# Patient Record
Sex: MALE | Race: WHITE | Employment: FULL TIME | URBAN - NONMETROPOLITAN AREA
[De-identification: names, ages, dates, MRNs, and addresses within clinical notes are randomized per-mention and may not be internally consistent; named-entity substitution may affect disease eponyms.]

---

## 2017-12-04 ENCOUNTER — HOSPITAL ENCOUNTER (OUTPATIENT)
Dept: CT IMAGING | Age: 57
Discharge: HOME | End: 2017-12-04
Payer: COMMERCIAL

## 2017-12-04 DIAGNOSIS — Z72.0 TOBACCO ABUSE: ICD-10-CM

## 2017-12-04 PROCEDURE — G0297 LDCT FOR LUNG CA SCREEN: HCPCS

## 2018-09-10 ENCOUNTER — HOSPITAL ENCOUNTER (EMERGENCY)
Age: 58
Discharge: HOME | End: 2018-09-10
Payer: COMMERCIAL

## 2018-09-10 ENCOUNTER — HOSPITAL ENCOUNTER (EMERGENCY)
Dept: GENERAL RADIOLOGY | Age: 58
Discharge: HOME | End: 2018-09-10
Payer: COMMERCIAL

## 2018-09-10 VITALS
RESPIRATION RATE: 18 BRPM | HEART RATE: 68 BPM | SYSTOLIC BLOOD PRESSURE: 135 MMHG | OXYGEN SATURATION: 92 % | HEIGHT: 70 IN | WEIGHT: 215 LBS | DIASTOLIC BLOOD PRESSURE: 65 MMHG | TEMPERATURE: 97.8 F | BODY MASS INDEX: 30.78 KG/M2

## 2018-09-10 DIAGNOSIS — J20.9 COPD (CHRONIC OBSTRUCTIVE PULMONARY DISEASE) WITH ACUTE BRONCHITIS (HCC): Primary | ICD-10-CM

## 2018-09-10 DIAGNOSIS — J44.0 COPD (CHRONIC OBSTRUCTIVE PULMONARY DISEASE) WITH ACUTE BRONCHITIS (HCC): Primary | ICD-10-CM

## 2018-09-10 PROCEDURE — 99203 OFFICE O/P NEW LOW 30 MIN: CPT | Performed by: NURSE PRACTITIONER

## 2018-09-10 PROCEDURE — 99214 OFFICE O/P EST MOD 30 MIN: CPT

## 2018-09-10 PROCEDURE — 6370000000 HC RX 637 (ALT 250 FOR IP): Performed by: NURSE PRACTITIONER

## 2018-09-10 PROCEDURE — 71046 X-RAY EXAM CHEST 2 VIEWS: CPT

## 2018-09-10 PROCEDURE — 2709999900 HC NON-CHARGEABLE SUPPLY

## 2018-09-10 PROCEDURE — 94640 AIRWAY INHALATION TREATMENT: CPT

## 2018-09-10 RX ORDER — PREDNISONE 20 MG/1
20 TABLET ORAL 2 TIMES DAILY
Qty: 10 TABLET | Refills: 0 | Status: SHIPPED | OUTPATIENT
Start: 2018-09-10 | End: 2018-09-15

## 2018-09-10 RX ORDER — PROMETHAZINE HYDROCHLORIDE AND CODEINE PHOSPHATE 6.25; 1 MG/5ML; MG/5ML
5 SYRUP ORAL 4 TIMES DAILY PRN
Qty: 120 ML | Refills: 0 | Status: SHIPPED | OUTPATIENT
Start: 2018-09-10 | End: 2018-09-17

## 2018-09-10 RX ORDER — IPRATROPIUM BROMIDE AND ALBUTEROL SULFATE 2.5; .5 MG/3ML; MG/3ML
1 SOLUTION RESPIRATORY (INHALATION) ONCE
Status: COMPLETED | OUTPATIENT
Start: 2018-09-10 | End: 2018-09-10

## 2018-09-10 RX ORDER — LEVOFLOXACIN 750 MG/1
750 TABLET ORAL DAILY
Qty: 5 TABLET | Refills: 0 | Status: SHIPPED | OUTPATIENT
Start: 2018-09-10 | End: 2018-09-15

## 2018-09-10 RX ORDER — GLIMEPIRIDE 1 MG/1
1 TABLET ORAL 2 TIMES DAILY
COMMUNITY

## 2018-09-10 RX ORDER — ALBUTEROL SULFATE 2.5 MG/3ML
2.5 SOLUTION RESPIRATORY (INHALATION) EVERY 6 HOURS PRN
COMMUNITY

## 2018-09-10 RX ORDER — TAMSULOSIN HYDROCHLORIDE 0.4 MG/1
0.4 CAPSULE ORAL DAILY
COMMUNITY
End: 2019-06-19

## 2018-09-10 RX ADMIN — IPRATROPIUM BROMIDE AND ALBUTEROL SULFATE 1 AMPULE: .5; 3 SOLUTION RESPIRATORY (INHALATION) at 10:03

## 2018-09-10 ASSESSMENT — ENCOUNTER SYMPTOMS
RHINORRHEA: 0
ABDOMINAL PAIN: 0
SHORTNESS OF BREATH: 1
SINUS PRESSURE: 0
DIARRHEA: 0
CHEST TIGHTNESS: 0
VOMITING: 0
NAUSEA: 0
SINUS PAIN: 0
BACK PAIN: 0
COUGH: 1
SORE THROAT: 0

## 2018-09-10 NOTE — ED TRIAGE NOTES
Pt to [facility] ambulatory with wife with URI s/s. Pt stated cough started on Friday. Pt has runny nose, no fever, and no nausea or vomiting.

## 2018-09-10 NOTE — ED NOTES
Patient discharge instructions given to pt and pt verbalized understanding, 3 px given, no other needs at this time, and pt left in stable condition.      Sarwat Mendoza RN  09/10/18 [id number]

## 2018-09-10 NOTE — LETTER
[facility]  [address] [place], [place] [address]ROHAN ROSS II.[facility] [address]  Phone: [phone]               September 10, 2018    Patient: Matthew Romeo   YOB: 1960   Date of Visit: 9/10/2018       To Whom It May Concern: Cheryle Lefevre was seen and treated in our emergency department on 9/10/2018. He may return to work on 9/12/2018.       Sincerely,       ADDY Hidalgo CNP         Signature:Electronically signed by ADDY Hidalgo CNP on 9/10/2018 at 10:46 AM  __________________________________

## 2018-09-10 NOTE — ED PROVIDER NOTES
Exam   Constitutional: He is oriented to person, place, and time. Vital signs are normal. He appears well-developed and well-nourished. He is cooperative. Non-toxic appearance. He does not have a sickly appearance. He does not appear ill. No distress. HENT:   Head: Normocephalic. Right Ear: Hearing, tympanic membrane, external ear and ear canal normal. No drainage, swelling or tenderness. No mastoid tenderness. Left Ear: Hearing, tympanic membrane, external ear and ear canal normal. No drainage, swelling or tenderness. No mastoid tenderness. Nose: Nose normal. Right sinus exhibits no maxillary sinus tenderness and no frontal sinus tenderness. Left sinus exhibits no maxillary sinus tenderness and no frontal sinus tenderness. Mouth/Throat: Uvula is midline, oropharynx is clear and moist and mucous membranes are normal. No oropharyngeal exudate, posterior oropharyngeal edema or posterior oropharyngeal erythema. Neck: Normal range of motion and full passive range of motion without pain. Neck supple. Cardiovascular: Normal rate, regular rhythm, S1 normal, S2 normal and normal heart sounds. Pulmonary/Chest: Effort normal. No accessory muscle usage. No respiratory distress. He has decreased breath sounds in the right upper field, the right middle field, the right lower field, the left upper field, the left middle field and the left lower field. He has no wheezes. He has no rhonchi. He has rales in the right lower field. He exhibits no tenderness. Abdominal: Normal appearance. Lymphadenopathy:        Head (right side): No submental, no submandibular, no tonsillar, no preauricular, no posterior auricular and no occipital adenopathy present. Head (left side): No submental, no submandibular, no tonsillar, no preauricular, no posterior auricular and no occipital adenopathy present. He has no cervical adenopathy. Right: No supraclavicular adenopathy present.         Left: No supraclavicular the treatment plan. PATIENT REFERRED TO:  May Mao MD  [address] [facility] [address] / [facility] [place] [address]      DISCHARGE MEDICATIONS:  New Prescriptions    LEVOFLOXACIN (LEVAQUIN) 750 MG TABLET    Take 1 tablet by mouth daily for 5 days    PREDNISONE (DELTASONE) 20 MG TABLET    Take 1 tablet by mouth 2 times daily for 5 days    PROMETHAZINE-CODEINE (PHENERGAN WITH CODEINE) 6.25-10 MG/5ML SYRUP    Take 5 mLs by mouth 4 times daily as needed for Cough for up to 7 days. .     Controlled Substances Monitoring:     RX Monitoring 9/10/2018   Attestation The Prescription Monitoring Report for this patient was reviewed today. Documentation No signs of potential drug abuse or diversion identified. Patient give standard precautions with this class of medications such as may cause drowsiness. May impair ability to operate vehicles or machinery. Do not use in combination with alcohol while taking this medication                                              Supportive Documentation of Scheduled Medication    1. Patient's medical history, current medications,and those prescribed by other   providers and OARRS, were reviewed by me. 2. Physical exam revealed see assessment portion of chart. 3. Diagnosis: COPD with acute bronchitis    4. Contraindications were ruled out, no current pain medication usage, no drug interactions with current medication, no documented history of substance abuse or inpatient/outpatient rehabilitation. 5. Rationale for prescription(s): Phenergan With Codeine 5 MLS every 6 hours for cough and congestion dispense 120 with no refills. This is for pain across the upper abdomen and chest with cough.     Discontinued Medications    No medications on file       Current Discharge Medication List          ADDY Hidalgo - CNP    (Please note that portions of this note were completed with a voice recognition program.  Efforts were made to edit the dictations but occasionally words are

## 2019-03-12 ENCOUNTER — HOSPITAL ENCOUNTER (OUTPATIENT)
Dept: CT IMAGING | Age: 59
Discharge: HOME | End: 2019-03-12
Payer: COMMERCIAL

## 2019-03-12 DIAGNOSIS — Z72.0 TOBACCO USE: ICD-10-CM

## 2019-03-12 PROCEDURE — G0297 LDCT FOR LUNG CA SCREEN: HCPCS

## 2019-06-11 ENCOUNTER — HOSPITAL ENCOUNTER (OUTPATIENT)
Dept: MRI IMAGING | Age: 59
Discharge: HOME | End: 2019-06-11
Payer: COMMERCIAL

## 2019-06-11 DIAGNOSIS — R42 DIZZINESS: ICD-10-CM

## 2019-06-11 PROCEDURE — 70553 MRI BRAIN STEM W/O & W/DYE: CPT

## 2019-06-11 PROCEDURE — 6360000004 HC RX CONTRAST MEDICATION: Performed by: FAMILY MEDICINE

## 2019-06-11 PROCEDURE — A9579 GAD-BASE MR CONTRAST NOS,1ML: HCPCS | Performed by: FAMILY MEDICINE

## 2019-06-11 RX ADMIN — GADOTERIDOL 20 ML: 279.3 INJECTION, SOLUTION INTRAVENOUS at 08:13

## 2019-06-19 ENCOUNTER — OFFICE VISIT (OUTPATIENT)
Dept: CARDIOLOGY CLINIC | Age: 59
End: 2019-06-19
Payer: COMMERCIAL

## 2019-06-19 VITALS
DIASTOLIC BLOOD PRESSURE: 62 MMHG | HEART RATE: 80 BPM | SYSTOLIC BLOOD PRESSURE: 130 MMHG | WEIGHT: 217 LBS | BODY MASS INDEX: 31.07 KG/M2 | HEIGHT: 70 IN

## 2019-06-19 DIAGNOSIS — R06.09 DYSPNEA ON EXERTION: ICD-10-CM

## 2019-06-19 DIAGNOSIS — R07.89 CHEST HEAVINESS: ICD-10-CM

## 2019-06-19 DIAGNOSIS — R42 DIZZINESS: ICD-10-CM

## 2019-06-19 DIAGNOSIS — R94.31 ABNORMAL ECG: Primary | ICD-10-CM

## 2019-06-19 DIAGNOSIS — F17.200 SMOKING: ICD-10-CM

## 2019-06-19 PROCEDURE — 99204 OFFICE O/P NEW MOD 45 MIN: CPT | Performed by: NUCLEAR MEDICINE

## 2019-06-19 RX ORDER — ACETAZOLAMIDE 500 MG/1
500 CAPSULE, EXTENDED RELEASE ORAL 2 TIMES DAILY
COMMUNITY
End: 2020-03-05

## 2019-06-19 ASSESSMENT — ENCOUNTER SYMPTOMS
COLOR CHANGE: 0
ABDOMINAL DISTENTION: 0
CONSTIPATION: 0
VOMITING: 0
CHEST TIGHTNESS: 0
SHORTNESS OF BREATH: 1
BLOOD IN STOOL: 0
NAUSEA: 0
BACK PAIN: 1
RECTAL PAIN: 0
RHINORRHEA: 0
ANAL BLEEDING: 0
DIARRHEA: 0
PHOTOPHOBIA: 0
ABDOMINAL PAIN: 0

## 2019-06-19 NOTE — PROGRESS NOTES
[address] CARDIOLOGY  [facility] [address]  [place] [address]  Dept: [phone]  Dept Fax: [phone]  Loc: [phone]    Visit Date: 2019    Alba Farr is a 60 y.o. male who presents todayfor:  Chief Complaint   Patient presents with    New Patient    Check-Up    Dizziness    Diabetes    Shortness of Breath   here for the first time  Started with dizziness and vertigo type situation   Started a month or so ago  Seen PCP and had some work up   ECG was abnormal   RBBB and LAFB  Diffuse st st changes   Dizziness is motion related most of the times  Balance type of issue  No full blown syncope  Does have some dyspnea on heavy exertion   Might have some chest tightness at times  Not exertional   No radiation   Mild in nature  Heavy type   No known CAD  Does have Dm for a while   Does have borderline BP and hyperlipidemia  He is a smoker as well   Been smoking for a long while  Few cig per day   Family history of CAD       HPI:  HPI  Past Medical History:   Diagnosis Date    Asthma     COPD (chronic obstructive pulmonary disease) ([facility] [address]       Past Surgical History:   Procedure Laterality Date    APPENDECTOMY      HERNIA REPAIR       History reviewed. No pertinent family history.   Social History     Tobacco Use    Smoking status: Former Smoker     Types: Cigarettes     Last attempt to quit: 2014     Years since quittin.5    Smokeless tobacco: Never Used   Substance Use Topics    Alcohol use: No      Current Outpatient Medications   Medication Sig Dispense Refill    acetaZOLAMIDE (DIAMOX) 500 MG extended release capsule Take 500 mg by mouth 2 times daily      metFORMIN (GLUCOPHAGE) 1000 MG tablet Take 1,000 mg by mouth 2 times daily (with meals)      glimepiride (AMARYL) 1 MG tablet Take 1 mg by mouth 2 times daily      Empagliflozin-Linagliptin (GLYXAMBI) 10-5 MG TABS Take 10 mg by mouth daily      Albuterol Sulfate (PROAIR HFA IN) Inhale 1-2 puffs into the lungs as needed      Budesonide-Formoterol Fumarate (SYMBICORT IN) Inhale into the lungs      albuterol (PROVENTIL) (2.5 MG/3ML) 0.083% nebulizer solution Take 2.5 mg by nebulization every 6 hours as needed for Wheezing       No current facility-administered medications for this visit. Allergies   Allergen Reactions    Amoxicillin Swelling     Health Maintenance   Topic Date Due    Hepatitis C screen  1960    Pneumococcal 0-64 years Vaccine (1 of 1 - PPSV23) 03/03/1965    HIV screen  03/03/1974    DTaP/Tdap/Td vaccine (1 - Tdap) 03/03/1978    Shingles Vaccine (1 of 2) 03/03/2009    Colon cancer screen colonoscopy  03/03/2009    Potassium monitoring  02/07/2016    Creatinine monitoring  02/07/2016    Lipid screen  09/14/2016    Flu vaccine (Season Ended) 09/01/2019       Subjective:  Review of Systems   Constitutional: Positive for fatigue. Negative for chills and diaphoresis. HENT: Negative for ear discharge, nosebleeds and rhinorrhea. Eyes: Negative for photophobia. Respiratory: Positive for shortness of breath. Negative for chest tightness. Cardiovascular: Negative for chest pain, palpitations and leg swelling. Gastrointestinal: Negative for abdominal distention, abdominal pain, anal bleeding, blood in stool, constipation, diarrhea, nausea, rectal pain and vomiting. Endocrine: Negative for polyphagia. Genitourinary: Negative for dysuria, frequency and urgency. Musculoskeletal: Positive for back pain. Negative for arthralgias, gait problem, joint swelling, myalgias, neck pain and neck stiffness. Skin: Negative for color change, pallor, rash and wound. Allergic/Immunologic: Negative for food allergies. Neurological: Positive for dizziness and headaches. Negative for syncope. Hematological: Negative for adenopathy.    Psychiatric/Behavioral: Negative for agitation, behavioral problems, confusion, decreased concentration, dysphoric mood and hallucinations. The patient is not nervous/anxious and is not hyperactive. Objective:  Physical Exam   Constitutional: He is oriented to person, place, and time. He appears well-developed. HENT:   Head: Normocephalic. Right Ear: External ear normal.   Left Ear: External ear normal.   Nose: Nose normal.   Mouth/Throat: Oropharynx is clear and moist.   Eyes: Pupils are equal, round, and reactive to light. Conjunctivae are normal. Right eye exhibits no discharge. Neck: Neck supple. No JVD present. No tracheal deviation present. No thyromegaly present. Cardiovascular: Normal rate, regular rhythm and intact distal pulses. Exam reveals no gallop. Murmur heard. Pulmonary/Chest: No stridor. No respiratory distress. He has no rales. He exhibits no tenderness. Abdominal: He exhibits no distension and no mass. There is no tenderness. There is no rebound and no guarding. No hernia. Musculoskeletal: He exhibits no edema. Lymphadenopathy:     He has no cervical adenopathy. Neurological: He is alert and oriented to person, place, and time. He displays normal reflexes. No cranial nerve deficit or sensory deficit. He exhibits normal muscle tone. Coordination normal.   Skin: No rash noted. He is not diaphoretic. No erythema. Psychiatric: He has a normal mood and affect. His behavior is normal. Judgment and thought content normal.     /62   Pulse 80   Ht 5' 10\" (1.778 m)   Wt 217 lb (98.4 kg)   BMI 31.14 kg/m²     Assessment:      Diagnosis Orders   1. Abnormal ECG  ECHO Complete 2D W Doppler W Color    NM MYOCARDIAL SPECT REST EXERCISE OR RX   2. Dyspnea on exertion  ECHO Complete 2D W Doppler W Color    NM MYOCARDIAL SPECT REST EXERCISE OR RX   3. Dizziness     4. Smoking     5.  Chest heaviness  ECHO Complete 2D W Doppler W Color    NM MYOCARDIAL SPECT REST EXERCISE OR RX   does have underlying COPD and higher risk for CAD  Concerning risk and symptoms  Dizziness is likely not cardiac and more ENT related  Yet I think he does have underlying CAD  ? ? Need for cath vs stress test to start with   Smoking: discussed with the patient the importance of smoke cessation especially with the risk of CAD. ECG reviewed today     Plan:  Return in about 3 months (around 9/19/2019). Discussed  Non invasive cardiac testing will be ordered to further evaluate for any ischemic or structural heart disease as a cause of the patient symptoms. We will proceed with a Stress Cardiolite test and echo soon. Consider antivert vs refer to ENT  Decide after that   Patient was advised to report to the ER if he has recurrent symptoms with specific instructions given about severity and duration of symptoms    Continue risk factor modification and medical management  Thank you for allowing me to participate in the care of your patient. Please don't hesitate to contact me regarding any further issues related to the patient care    Orders Placed:  Orders Placed This Encounter   Procedures    NM MYOCARDIAL SPECT REST EXERCISE OR RX     Standing Status:   Future     Standing Expiration Date:   6/18/2020     Order Specific Question:   Reason for Exam?     Answer:   Chest pain     Order Specific Question:   Reason for Exam?     Answer:   EKG abnormalities    ECHO Complete 2D W Doppler W Color     Standing Status:   Future     Standing Expiration Date:   6/18/2020     Order Specific Question:   Reason for exam:     Answer:   dyspnea       Medications Prescribed:  No orders of the defined types were placed in this encounter. Discussed use, benefit, and side effects of prescribed medications. All patient questions answered. Pt voicedunderstanding. Instructed to continue current medications, diet and exercise. Continue risk factor modification and medical management. Patient agreed with treatment plan. Follow up as directed.     Electronically signedby Sarthak Rousseau MD on 6/19/2019 at 8:08 AM

## 2019-07-03 ENCOUNTER — HOSPITAL ENCOUNTER (OUTPATIENT)
Dept: NON INVASIVE DIAGNOSTICS | Age: 59
Discharge: HOME | End: 2019-07-03
Payer: COMMERCIAL

## 2019-07-03 VITALS — WEIGHT: 215 LBS | HEIGHT: 70 IN | BODY MASS INDEX: 30.78 KG/M2

## 2019-07-03 DIAGNOSIS — R07.89 CHEST HEAVINESS: ICD-10-CM

## 2019-07-03 DIAGNOSIS — R94.31 ABNORMAL ECG: ICD-10-CM

## 2019-07-03 DIAGNOSIS — R06.09 DYSPNEA ON EXERTION: ICD-10-CM

## 2019-07-03 LAB
LV EF: 55 %
LVEF MODALITY: NORMAL

## 2019-07-03 PROCEDURE — 93018 CV STRESS TEST I&R ONLY: CPT | Performed by: NUCLEAR MEDICINE

## 2019-07-03 PROCEDURE — A9500 TC99M SESTAMIBI: HCPCS | Performed by: NUCLEAR MEDICINE

## 2019-07-03 PROCEDURE — 3430000000 HC RX DIAGNOSTIC RADIOPHARMACEUTICAL: Performed by: NUCLEAR MEDICINE

## 2019-07-03 PROCEDURE — 78452 HT MUSCLE IMAGE SPECT MULT: CPT | Performed by: NUCLEAR MEDICINE

## 2019-07-03 PROCEDURE — 93016 CV STRESS TEST SUPVJ ONLY: CPT | Performed by: NUCLEAR MEDICINE

## 2019-07-03 PROCEDURE — 78452 HT MUSCLE IMAGE SPECT MULT: CPT

## 2019-07-03 PROCEDURE — 93017 CV STRESS TEST TRACING ONLY: CPT | Performed by: NUCLEAR MEDICINE

## 2019-07-03 PROCEDURE — 2709999900 HC NON-CHARGEABLE SUPPLY

## 2019-07-03 PROCEDURE — 93306 TTE W/DOPPLER COMPLETE: CPT

## 2019-07-03 PROCEDURE — 6360000002 HC RX W HCPCS

## 2019-07-03 RX ADMIN — Medication 32.9 MILLICURIE: at 13:30

## 2019-07-03 RX ADMIN — Medication 10.3 MILLICURIE: at 11:59

## 2019-07-05 ENCOUNTER — TELEPHONE (OUTPATIENT)
Dept: CARDIOLOGY CLINIC | Age: 59
End: 2019-07-05

## 2019-07-24 ENCOUNTER — INITIAL CONSULT (OUTPATIENT)
Dept: NEUROLOGY | Age: 59
End: 2019-07-24
Payer: COMMERCIAL

## 2019-07-24 VITALS
BODY MASS INDEX: 31.07 KG/M2 | SYSTOLIC BLOOD PRESSURE: 122 MMHG | DIASTOLIC BLOOD PRESSURE: 66 MMHG | HEART RATE: 68 BPM | WEIGHT: 217 LBS | HEIGHT: 70 IN

## 2019-07-24 DIAGNOSIS — R42 DIZZINESS: Primary | ICD-10-CM

## 2019-07-24 DIAGNOSIS — R55 NEAR SYNCOPE: ICD-10-CM

## 2019-07-24 PROCEDURE — 99244 OFF/OP CNSLTJ NEW/EST MOD 40: CPT | Performed by: PSYCHIATRY & NEUROLOGY

## 2019-07-24 NOTE — PROGRESS NOTES
neck.  There is no resting tremor, no pin rolling, no bradykinesia, no Hypohonia, normal blink rate. Musculoskeletal: Has no hand arthritis, no limitation of ROM in any of the four extremities. There is no leg edema . The Heart was regular in rate and rhythm. No heart murmur  Chest Clear     I reviewed the MRI brain and agree with interpretation. Results for orders placed during the hospital encounter of 06/11/19   MRI BRAIN W WO CONTRAST    Narrative PROCEDURE: MRI BRAIN W WO CONTRAST    CLINICAL INFORMATIONDizziness. Dizziness since 6/1/2019. COMPARISON: No prior study. TECHNIQUE: Multiplanar and multiple spin echo T1 and T2-weighted images were obtained through the brain before and after the administration of intravenous contrast. Fast imaging techniques were utilized. FINDINGS:        The diffusion-weighted images are normal. The brain volume is at the lower limits of normal..There are no intra-or extra-axial collections. There is no hydrocephalus, midline shift or mass effect. On the FLAIR and T2-weighted sequences, there is a minimal amount of signal hyperintensity in the white matter of the brain. This is most likely due to minimal severity chronic small vessel ischemic changes. On the gradient echo T2-weighted images, there is no susceptibility artifact present. There is no abnormal enhancement in the brain. The major intracranial vascular flow voids are present. The midline craniocervical junction structures are normal.  The brainstem and pituitary gland are normal.    There are areas of mucosal thickening in both maxillary sinuses. Impression    1. Normal MRI of the brain. 2. Mucosal thickening in the maxillary sinuses. **This report has been created using voice recognition software. It may contain minor errors which are inherent in voice recognition technology. **      Final report electronically signed by Dr. Baljeet Brewster on 6/11/2019 11:08 AM       We

## 2019-08-12 ENCOUNTER — TELEPHONE (OUTPATIENT)
Dept: NEUROLOGY | Age: 59
End: 2019-08-12

## 2019-08-21 ENCOUNTER — HOSPITAL ENCOUNTER (OUTPATIENT)
Dept: CT IMAGING | Age: 59
Discharge: HOME | End: 2019-08-21
Payer: COMMERCIAL

## 2019-08-21 ENCOUNTER — HOSPITAL ENCOUNTER (OUTPATIENT)
Dept: NON INVASIVE DIAGNOSTICS | Age: 59
Discharge: HOME | End: 2019-08-21
Payer: COMMERCIAL

## 2019-08-21 VITALS — HEIGHT: 70 IN | BODY MASS INDEX: 29.49 KG/M2 | WEIGHT: 206 LBS

## 2019-08-21 DIAGNOSIS — G45.9 TIA (TRANSIENT ISCHEMIC ATTACK): ICD-10-CM

## 2019-08-21 DIAGNOSIS — R42 DIZZINESS: ICD-10-CM

## 2019-08-21 DIAGNOSIS — R55 NEAR SYNCOPE: ICD-10-CM

## 2019-08-21 DIAGNOSIS — R27.0 ATAXIA: ICD-10-CM

## 2019-08-21 LAB — POC CREATININE WHOLE BLOOD: 1 MG/DL (ref 0.5–1.2)

## 2019-08-21 PROCEDURE — 6360000004 HC RX CONTRAST MEDICATION: Performed by: FAMILY MEDICINE

## 2019-08-21 PROCEDURE — 93017 CV STRESS TEST TRACING ONLY: CPT

## 2019-08-21 PROCEDURE — 2709999900 HC NON-CHARGEABLE SUPPLY

## 2019-08-21 PROCEDURE — 70498 CT ANGIOGRAPHY NECK: CPT

## 2019-08-21 PROCEDURE — 93660 TILT TABLE EVALUATION: CPT

## 2019-08-21 PROCEDURE — 82565 ASSAY OF CREATININE: CPT

## 2019-08-21 RX ADMIN — IOPAMIDOL 85 ML: 755 INJECTION, SOLUTION INTRAVENOUS at 15:15

## 2020-01-31 ENCOUNTER — HOSPITAL ENCOUNTER (OUTPATIENT)
Age: 60
Discharge: HOME | End: 2020-01-31
Payer: COMMERCIAL

## 2020-01-31 LAB
ALBUMIN SERPL-MCNC: 3.9 G/DL (ref 3.5–5.1)
ALP BLD-CCNC: 90 U/L (ref 38–126)
ALT SERPL-CCNC: 25 U/L (ref 11–66)
ANION GAP SERPL CALCULATED.3IONS-SCNC: 11 MEQ/L (ref 8–16)
AST SERPL-CCNC: 20 U/L (ref 5–40)
AVERAGE GLUCOSE: 126 MG/DL (ref 70–126)
BASOPHILS # BLD: 0.6 %
BASOPHILS ABSOLUTE: 0 THOU/MM3 (ref 0–0.1)
BILIRUB SERPL-MCNC: 0.4 MG/DL (ref 0.3–1.2)
BILIRUBIN DIRECT: < 0.2 MG/DL (ref 0–0.3)
BUN BLDV-MCNC: 15 MG/DL (ref 7–22)
CALCIUM SERPL-MCNC: 9.3 MG/DL (ref 8.5–10.5)
CHLORIDE BLD-SCNC: 100 MEQ/L (ref 98–111)
CHOLESTEROL, TOTAL: 154 MG/DL (ref 100–199)
CO2: 25 MEQ/L (ref 23–33)
CREAT SERPL-MCNC: 0.9 MG/DL (ref 0.4–1.2)
CREATININE, URINE: 105 MG/DL
EOSINOPHIL # BLD: 2.5 %
EOSINOPHILS ABSOLUTE: 0.2 THOU/MM3 (ref 0–0.4)
ERYTHROCYTE [DISTWIDTH] IN BLOOD BY AUTOMATED COUNT: 13.2 % (ref 11.5–14.5)
ERYTHROCYTE [DISTWIDTH] IN BLOOD BY AUTOMATED COUNT: 49.1 FL (ref 35–45)
GFR SERPL CREATININE-BSD FRML MDRD: 86 ML/MIN/1.73M2
GLUCOSE FASTING: 92 MG/DL (ref 70–108)
HBA1C MFR BLD: 6.2 % (ref 4.4–6.4)
HCT VFR BLD CALC: 48.5 % (ref 42–52)
HDLC SERPL-MCNC: 38 MG/DL
HEMOGLOBIN: 15.4 GM/DL (ref 14–18)
IMMATURE GRANS (ABS): 0.06 THOU/MM3 (ref 0–0.07)
IMMATURE GRANULOCYTES: 0.9 %
LDL CHOLESTEROL CALCULATED: 101 MG/DL
LYMPHOCYTES # BLD: 14 %
LYMPHOCYTES ABSOLUTE: 1 THOU/MM3 (ref 1–4.8)
MCH RBC QN AUTO: 32 PG (ref 26–33)
MCHC RBC AUTO-ENTMCNC: 31.8 GM/DL (ref 32.2–35.5)
MCV RBC AUTO: 100.8 FL (ref 80–94)
MICROALBUMIN UR-MCNC: 2.04 MG/DL
MICROALBUMIN/CREAT UR-RTO: 19 MG/G (ref 0–30)
MONOCYTES # BLD: 11.1 %
MONOCYTES ABSOLUTE: 0.8 THOU/MM3 (ref 0.4–1.3)
NUCLEATED RED BLOOD CELLS: 0 /100 WBC
PLATELET # BLD: 278 THOU/MM3 (ref 130–400)
PMV BLD AUTO: 8.8 FL (ref 9.4–12.4)
POTASSIUM SERPL-SCNC: 4.7 MEQ/L (ref 3.5–5.2)
PROSTATE SPECIFIC ANTIGEN: 0.5 NG/ML (ref 0–1)
PROTEIN, URINE: 22.3 MG/DL
RBC # BLD: 4.81 MILL/MM3 (ref 4.7–6.1)
SEG NEUTROPHILS: 70.9 %
SEGMENTED NEUTROPHILS ABSOLUTE COUNT: 4.8 THOU/MM3 (ref 1.8–7.7)
SODIUM BLD-SCNC: 136 MEQ/L (ref 135–145)
TOTAL PROTEIN: 8 G/DL (ref 6.1–8)
TRIGL SERPL-MCNC: 76 MG/DL (ref 0–199)
WBC # BLD: 6.8 THOU/MM3 (ref 4.8–10.8)

## 2020-01-31 PROCEDURE — 80061 LIPID PANEL: CPT

## 2020-01-31 PROCEDURE — 80048 BASIC METABOLIC PNL TOTAL CA: CPT

## 2020-01-31 PROCEDURE — 80076 HEPATIC FUNCTION PANEL: CPT

## 2020-01-31 PROCEDURE — G0103 PSA SCREENING: HCPCS

## 2020-01-31 PROCEDURE — 85025 COMPLETE CBC W/AUTO DIFF WBC: CPT

## 2020-01-31 PROCEDURE — 36415 COLL VENOUS BLD VENIPUNCTURE: CPT

## 2020-01-31 PROCEDURE — 84156 ASSAY OF PROTEIN URINE: CPT

## 2020-01-31 PROCEDURE — 82043 UR ALBUMIN QUANTITATIVE: CPT

## 2020-01-31 PROCEDURE — 83036 HEMOGLOBIN GLYCOSYLATED A1C: CPT

## 2020-03-05 ENCOUNTER — HOSPITAL ENCOUNTER (INPATIENT)
Age: 60
LOS: 2 days | Discharge: HOME | DRG: 193 | End: 2020-03-07
Attending: EMERGENCY MEDICINE | Admitting: INTERNAL MEDICINE
Payer: COMMERCIAL

## 2020-03-05 ENCOUNTER — APPOINTMENT (OUTPATIENT)
Dept: GENERAL RADIOLOGY | Age: 60
DRG: 193 | End: 2020-03-05
Payer: COMMERCIAL

## 2020-03-05 PROBLEM — I45.2 RBBB WITH LEFT ANTERIOR FASCICULAR BLOCK: Status: ACTIVE | Noted: 2020-03-05

## 2020-03-05 PROBLEM — R79.89 ELEVATED LACTIC ACID LEVEL: Status: ACTIVE | Noted: 2020-03-05

## 2020-03-05 PROBLEM — R09.02 HYPOXIA: Status: ACTIVE | Noted: 2020-03-05

## 2020-03-05 PROBLEM — J45.909 MODERATE ASTHMA: Status: ACTIVE | Noted: 2020-03-05

## 2020-03-05 PROBLEM — J18.9 PNEUMONIA DUE TO INFECTIOUS ORGANISM: Status: ACTIVE | Noted: 2020-03-05

## 2020-03-05 PROBLEM — J10.1 INFLUENZA A: Status: ACTIVE | Noted: 2020-03-05

## 2020-03-05 PROBLEM — I45.10 RBBB: Status: ACTIVE | Noted: 2020-03-05

## 2020-03-05 PROBLEM — J44.0 CHRONIC OBSTRUCTIVE PULMONARY DISEASE WITH ACUTE LOWER RESPIRATORY INFECTION (HCC): Status: ACTIVE | Noted: 2020-03-05

## 2020-03-05 PROBLEM — E11.9 TYPE 2 DIABETES MELLITUS, WITHOUT LONG-TERM CURRENT USE OF INSULIN (HCC): Status: ACTIVE | Noted: 2020-03-05

## 2020-03-05 LAB
ALBUMIN SERPL-MCNC: 4.2 G/DL (ref 3.5–5.1)
ALP BLD-CCNC: 91 U/L (ref 38–126)
ALT SERPL-CCNC: 16 U/L (ref 11–66)
ANION GAP SERPL CALCULATED.3IONS-SCNC: 17 MEQ/L (ref 8–16)
AST SERPL-CCNC: 21 U/L (ref 5–40)
AVERAGE GLUCOSE: 135 MG/DL (ref 70–126)
BASOPHILS # BLD: 0.2 %
BASOPHILS ABSOLUTE: 0 THOU/MM3 (ref 0–0.1)
BILIRUB SERPL-MCNC: 0.3 MG/DL (ref 0.3–1.2)
BUN BLDV-MCNC: 15 MG/DL (ref 7–22)
CALCIUM SERPL-MCNC: 9.7 MG/DL (ref 8.5–10.5)
CHLORIDE BLD-SCNC: 99 MEQ/L (ref 98–111)
CO2: 22 MEQ/L (ref 23–33)
CREAT SERPL-MCNC: 0.8 MG/DL (ref 0.4–1.2)
EKG ATRIAL RATE: 92 BPM
EKG P AXIS: 70 DEGREES
EKG P-R INTERVAL: 150 MS
EKG Q-T INTERVAL: 408 MS
EKG QRS DURATION: 170 MS
EKG QTC CALCULATION (BAZETT): 504 MS
EKG R AXIS: -88 DEGREES
EKG T AXIS: 74 DEGREES
EKG VENTRICULAR RATE: 92 BPM
EOSINOPHIL # BLD: 2.6 %
EOSINOPHILS ABSOLUTE: 0.3 THOU/MM3 (ref 0–0.4)
ERYTHROCYTE [DISTWIDTH] IN BLOOD BY AUTOMATED COUNT: 14 % (ref 11.5–14.5)
ERYTHROCYTE [DISTWIDTH] IN BLOOD BY AUTOMATED COUNT: 51.8 FL (ref 35–45)
FLU A ANTIGEN: POSITIVE
FLU B ANTIGEN: NEGATIVE
GFR SERPL CREATININE-BSD FRML MDRD: > 90 ML/MIN/1.73M2
GLUCOSE BLD-MCNC: 184 MG/DL (ref 70–108)
GLUCOSE BLD-MCNC: 214 MG/DL (ref 70–108)
GLUCOSE BLD-MCNC: 238 MG/DL (ref 70–108)
HBA1C MFR BLD: 6.5 % (ref 4.4–6.4)
HCT VFR BLD CALC: 49.6 % (ref 42–52)
HEMOGLOBIN: 15.7 GM/DL (ref 14–18)
IMMATURE GRANS (ABS): 0.07 THOU/MM3 (ref 0–0.07)
IMMATURE GRANULOCYTES: 0.7 %
INR BLD: 1.03 (ref 0.85–1.13)
LACTIC ACID: 1.5 MMOL/L (ref 0.5–2.2)
LACTIC ACID: 3.9 MMOL/L (ref 0.5–2.2)
LYMPHOCYTES # BLD: 3.8 %
LYMPHOCYTES ABSOLUTE: 0.4 THOU/MM3 (ref 1–4.8)
MCH RBC QN AUTO: 32.2 PG (ref 26–33)
MCHC RBC AUTO-ENTMCNC: 31.7 GM/DL (ref 32.2–35.5)
MCV RBC AUTO: 101.6 FL (ref 80–94)
MONOCYTES # BLD: 4.8 %
MONOCYTES ABSOLUTE: 0.5 THOU/MM3 (ref 0.4–1.3)
NUCLEATED RED BLOOD CELLS: 0 /100 WBC
OSMOLALITY CALCULATION: 281.3 MOSMOL/KG (ref 275–300)
PLATELET # BLD: 236 THOU/MM3 (ref 130–400)
PLATELET ESTIMATE: ADEQUATE
PMV BLD AUTO: 9 FL (ref 9.4–12.4)
POTASSIUM REFLEX MAGNESIUM: 4.1 MEQ/L (ref 3.5–5.2)
PRO-BNP: 624.3 PG/ML (ref 0–900)
RBC # BLD: 4.88 MILL/MM3 (ref 4.7–6.1)
SCAN OF BLOOD SMEAR: NORMAL
SEG NEUTROPHILS: 87.9 %
SEGMENTED NEUTROPHILS ABSOLUTE COUNT: 9.1 THOU/MM3 (ref 1.8–7.7)
SODIUM BLD-SCNC: 138 MEQ/L (ref 135–145)
TOTAL PROTEIN: 8.1 G/DL (ref 6.1–8)
TROPONIN T: < 0.01 NG/ML
WBC # BLD: 10.4 THOU/MM3 (ref 4.8–10.8)

## 2020-03-05 PROCEDURE — 87899 AGENT NOS ASSAY W/OPTIC: CPT

## 2020-03-05 PROCEDURE — 6360000002 HC RX W HCPCS: Performed by: EMERGENCY MEDICINE

## 2020-03-05 PROCEDURE — 96372 THER/PROPH/DIAG INJ SC/IM: CPT

## 2020-03-05 PROCEDURE — 94761 N-INVAS EAR/PLS OXIMETRY MLT: CPT

## 2020-03-05 PROCEDURE — 84484 ASSAY OF TROPONIN QUANT: CPT

## 2020-03-05 PROCEDURE — 96375 TX/PRO/DX INJ NEW DRUG ADDON: CPT

## 2020-03-05 PROCEDURE — 6360000002 HC RX W HCPCS: Performed by: INTERNAL MEDICINE

## 2020-03-05 PROCEDURE — 2580000003 HC RX 258: Performed by: INTERNAL MEDICINE

## 2020-03-05 PROCEDURE — 87449 NOS EACH ORGANISM AG IA: CPT

## 2020-03-05 PROCEDURE — 93010 ELECTROCARDIOGRAM REPORT: CPT | Performed by: NUCLEAR MEDICINE

## 2020-03-05 PROCEDURE — 6370000000 HC RX 637 (ALT 250 FOR IP): Performed by: INTERNAL MEDICINE

## 2020-03-05 PROCEDURE — 83605 ASSAY OF LACTIC ACID: CPT

## 2020-03-05 PROCEDURE — 85610 PROTHROMBIN TIME: CPT

## 2020-03-05 PROCEDURE — 83036 HEMOGLOBIN GLYCOSYLATED A1C: CPT

## 2020-03-05 PROCEDURE — 99285 EMERGENCY DEPT VISIT HI MDM: CPT

## 2020-03-05 PROCEDURE — 99223 1ST HOSP IP/OBS HIGH 75: CPT | Performed by: INTERNAL MEDICINE

## 2020-03-05 PROCEDURE — 6370000000 HC RX 637 (ALT 250 FOR IP): Performed by: EMERGENCY MEDICINE

## 2020-03-05 PROCEDURE — 96365 THER/PROPH/DIAG IV INF INIT: CPT

## 2020-03-05 PROCEDURE — 87804 INFLUENZA ASSAY W/OPTIC: CPT

## 2020-03-05 PROCEDURE — 36415 COLL VENOUS BLD VENIPUNCTURE: CPT

## 2020-03-05 PROCEDURE — 80053 COMPREHEN METABOLIC PANEL: CPT

## 2020-03-05 PROCEDURE — 96376 TX/PRO/DX INJ SAME DRUG ADON: CPT

## 2020-03-05 PROCEDURE — 85025 COMPLETE CBC W/AUTO DIFF WBC: CPT

## 2020-03-05 PROCEDURE — G0378 HOSPITAL OBSERVATION PER HR: HCPCS

## 2020-03-05 PROCEDURE — 96374 THER/PROPH/DIAG INJ IV PUSH: CPT

## 2020-03-05 PROCEDURE — 71045 X-RAY EXAM CHEST 1 VIEW: CPT

## 2020-03-05 PROCEDURE — 2060000000 HC ICU INTERMEDIATE R&B

## 2020-03-05 PROCEDURE — 93005 ELECTROCARDIOGRAM TRACING: CPT | Performed by: EMERGENCY MEDICINE

## 2020-03-05 PROCEDURE — 82948 REAGENT STRIP/BLOOD GLUCOSE: CPT

## 2020-03-05 PROCEDURE — 87040 BLOOD CULTURE FOR BACTERIA: CPT

## 2020-03-05 PROCEDURE — 2700000000 HC OXYGEN THERAPY PER DAY

## 2020-03-05 PROCEDURE — 83880 ASSAY OF NATRIURETIC PEPTIDE: CPT

## 2020-03-05 PROCEDURE — 2709999900 HC NON-CHARGEABLE SUPPLY

## 2020-03-05 PROCEDURE — 94640 AIRWAY INHALATION TREATMENT: CPT

## 2020-03-05 RX ORDER — FINASTERIDE 5 MG/1
5 TABLET, FILM COATED ORAL DAILY
COMMUNITY

## 2020-03-05 RX ORDER — LEVOFLOXACIN 5 MG/ML
500 INJECTION, SOLUTION INTRAVENOUS ONCE
Status: COMPLETED | OUTPATIENT
Start: 2020-03-05 | End: 2020-03-05

## 2020-03-05 RX ORDER — ACETAZOLAMIDE 250 MG/1
500 TABLET ORAL 2 TIMES DAILY
Status: DISCONTINUED | OUTPATIENT
Start: 2020-03-05 | End: 2020-03-07 | Stop reason: HOSPADM

## 2020-03-05 RX ORDER — METHYLPREDNISOLONE SODIUM SUCCINATE 40 MG/ML
40 INJECTION, POWDER, LYOPHILIZED, FOR SOLUTION INTRAMUSCULAR; INTRAVENOUS EVERY 8 HOURS
Status: DISCONTINUED | OUTPATIENT
Start: 2020-03-05 | End: 2020-03-07

## 2020-03-05 RX ORDER — DEXTROSE MONOHYDRATE 50 MG/ML
100 INJECTION, SOLUTION INTRAVENOUS PRN
Status: DISCONTINUED | OUTPATIENT
Start: 2020-03-05 | End: 2020-03-07 | Stop reason: HOSPADM

## 2020-03-05 RX ORDER — FAMOTIDINE 20 MG/1
20 TABLET, FILM COATED ORAL 2 TIMES DAILY
Status: DISCONTINUED | OUTPATIENT
Start: 2020-03-05 | End: 2020-03-07 | Stop reason: HOSPADM

## 2020-03-05 RX ORDER — LEVALBUTEROL INHALATION SOLUTION 1.25 MG/3ML
1.25 SOLUTION RESPIRATORY (INHALATION) ONCE
Status: COMPLETED | OUTPATIENT
Start: 2020-03-05 | End: 2020-03-05

## 2020-03-05 RX ORDER — MAGNESIUM SULFATE IN WATER 40 MG/ML
2 INJECTION, SOLUTION INTRAVENOUS PRN
Status: DISCONTINUED | OUTPATIENT
Start: 2020-03-05 | End: 2020-03-07 | Stop reason: HOSPADM

## 2020-03-05 RX ORDER — ONDANSETRON 2 MG/ML
4 INJECTION INTRAMUSCULAR; INTRAVENOUS EVERY 6 HOURS PRN
Status: DISCONTINUED | OUTPATIENT
Start: 2020-03-05 | End: 2020-03-07 | Stop reason: HOSPADM

## 2020-03-05 RX ORDER — POTASSIUM CHLORIDE 20 MEQ/1
40 TABLET, EXTENDED RELEASE ORAL PRN
Status: DISCONTINUED | OUTPATIENT
Start: 2020-03-05 | End: 2020-03-07 | Stop reason: HOSPADM

## 2020-03-05 RX ORDER — IPRATROPIUM BROMIDE AND ALBUTEROL SULFATE 2.5; .5 MG/3ML; MG/3ML
1 SOLUTION RESPIRATORY (INHALATION) ONCE
Status: COMPLETED | OUTPATIENT
Start: 2020-03-05 | End: 2020-03-05

## 2020-03-05 RX ORDER — POLYETHYLENE GLYCOL 3350 17 G/17G
17 POWDER, FOR SOLUTION ORAL DAILY PRN
Status: DISCONTINUED | OUTPATIENT
Start: 2020-03-05 | End: 2020-03-07 | Stop reason: HOSPADM

## 2020-03-05 RX ORDER — OSELTAMIVIR PHOSPHATE 75 MG/1
75 CAPSULE ORAL 2 TIMES DAILY
Status: DISCONTINUED | OUTPATIENT
Start: 2020-03-05 | End: 2020-03-07 | Stop reason: HOSPADM

## 2020-03-05 RX ORDER — PROMETHAZINE HYDROCHLORIDE 25 MG/1
12.5 TABLET ORAL EVERY 6 HOURS PRN
Status: DISCONTINUED | OUTPATIENT
Start: 2020-03-05 | End: 2020-03-07 | Stop reason: HOSPADM

## 2020-03-05 RX ORDER — OSELTAMIVIR PHOSPHATE 75 MG/1
75 CAPSULE ORAL ONCE
Status: COMPLETED | OUTPATIENT
Start: 2020-03-05 | End: 2020-03-05

## 2020-03-05 RX ORDER — METHYLPREDNISOLONE SODIUM SUCCINATE 125 MG/2ML
125 INJECTION, POWDER, LYOPHILIZED, FOR SOLUTION INTRAMUSCULAR; INTRAVENOUS ONCE
Status: COMPLETED | OUTPATIENT
Start: 2020-03-05 | End: 2020-03-05

## 2020-03-05 RX ORDER — POTASSIUM CHLORIDE 7.45 MG/ML
10 INJECTION INTRAVENOUS PRN
Status: DISCONTINUED | OUTPATIENT
Start: 2020-03-05 | End: 2020-03-07 | Stop reason: HOSPADM

## 2020-03-05 RX ORDER — IPRATROPIUM BROMIDE AND ALBUTEROL SULFATE 2.5; .5 MG/3ML; MG/3ML
1 SOLUTION RESPIRATORY (INHALATION)
Status: DISCONTINUED | OUTPATIENT
Start: 2020-03-05 | End: 2020-03-07 | Stop reason: HOSPADM

## 2020-03-05 RX ORDER — SODIUM CHLORIDE 0.9 % (FLUSH) 0.9 %
10 SYRINGE (ML) INJECTION EVERY 12 HOURS SCHEDULED
Status: DISCONTINUED | OUTPATIENT
Start: 2020-03-05 | End: 2020-03-07 | Stop reason: HOSPADM

## 2020-03-05 RX ORDER — GUAIFENESIN 600 MG/1
600 TABLET, EXTENDED RELEASE ORAL 2 TIMES DAILY
Status: DISCONTINUED | OUTPATIENT
Start: 2020-03-05 | End: 2020-03-07 | Stop reason: HOSPADM

## 2020-03-05 RX ORDER — ACETAMINOPHEN 650 MG/1
650 SUPPOSITORY RECTAL EVERY 6 HOURS PRN
Status: DISCONTINUED | OUTPATIENT
Start: 2020-03-05 | End: 2020-03-07 | Stop reason: HOSPADM

## 2020-03-05 RX ORDER — SODIUM CHLORIDE 0.9 % (FLUSH) 0.9 %
10 SYRINGE (ML) INJECTION PRN
Status: DISCONTINUED | OUTPATIENT
Start: 2020-03-05 | End: 2020-03-07 | Stop reason: HOSPADM

## 2020-03-05 RX ORDER — LEVOFLOXACIN 5 MG/ML
750 INJECTION, SOLUTION INTRAVENOUS EVERY 24 HOURS
Status: DISCONTINUED | OUTPATIENT
Start: 2020-03-06 | End: 2020-03-07 | Stop reason: HOSPADM

## 2020-03-05 RX ORDER — DEXTROSE MONOHYDRATE 25 G/50ML
12.5 INJECTION, SOLUTION INTRAVENOUS PRN
Status: DISCONTINUED | OUTPATIENT
Start: 2020-03-05 | End: 2020-03-07 | Stop reason: HOSPADM

## 2020-03-05 RX ORDER — NICOTINE POLACRILEX 4 MG
15 LOZENGE BUCCAL PRN
Status: DISCONTINUED | OUTPATIENT
Start: 2020-03-05 | End: 2020-03-07 | Stop reason: HOSPADM

## 2020-03-05 RX ORDER — SODIUM CHLORIDE 9 MG/ML
INJECTION, SOLUTION INTRAVENOUS CONTINUOUS
Status: DISCONTINUED | OUTPATIENT
Start: 2020-03-05 | End: 2020-03-07

## 2020-03-05 RX ORDER — ACETAMINOPHEN 325 MG/1
650 TABLET ORAL EVERY 6 HOURS PRN
Status: DISCONTINUED | OUTPATIENT
Start: 2020-03-05 | End: 2020-03-07 | Stop reason: HOSPADM

## 2020-03-05 RX ADMIN — INSULIN LISPRO 2 UNITS: 100 INJECTION, SOLUTION INTRAVENOUS; SUBCUTANEOUS at 19:07

## 2020-03-05 RX ADMIN — OSELTAMIVIR PHOSPHATE 75 MG: 75 CAPSULE ORAL at 21:20

## 2020-03-05 RX ADMIN — OSELTAMIVIR PHOSPHATE 75 MG: 75 CAPSULE ORAL at 13:19

## 2020-03-05 RX ADMIN — ENOXAPARIN SODIUM 40 MG: 40 INJECTION SUBCUTANEOUS at 19:06

## 2020-03-05 RX ADMIN — LEVALBUTEROL HYDROCHLORIDE 1.25 MG: 1.25 SOLUTION RESPIRATORY (INHALATION) at 11:04

## 2020-03-05 RX ADMIN — IPRATROPIUM BROMIDE AND ALBUTEROL SULFATE 1 AMPULE: .5; 3 SOLUTION RESPIRATORY (INHALATION) at 23:53

## 2020-03-05 RX ADMIN — METHYLPREDNISOLONE SODIUM SUCCINATE 125 MG: 125 INJECTION, POWDER, FOR SOLUTION INTRAMUSCULAR; INTRAVENOUS at 10:52

## 2020-03-05 RX ADMIN — ACETAZOLAMIDE 500 MG: 250 TABLET ORAL at 21:20

## 2020-03-05 RX ADMIN — SODIUM CHLORIDE: 9 INJECTION, SOLUTION INTRAVENOUS at 19:01

## 2020-03-05 RX ADMIN — FAMOTIDINE 20 MG: 20 TABLET ORAL at 21:30

## 2020-03-05 RX ADMIN — IPRATROPIUM BROMIDE AND ALBUTEROL SULFATE 1 AMPULE: .5; 3 SOLUTION RESPIRATORY (INHALATION) at 18:50

## 2020-03-05 RX ADMIN — METHYLPREDNISOLONE SODIUM SUCCINATE 40 MG: 40 INJECTION, POWDER, FOR SOLUTION INTRAMUSCULAR; INTRAVENOUS at 19:01

## 2020-03-05 RX ADMIN — GUAIFENESIN 600 MG: 600 TABLET, EXTENDED RELEASE ORAL at 21:20

## 2020-03-05 RX ADMIN — LEVOFLOXACIN 500 MG: 5 INJECTION, SOLUTION INTRAVENOUS at 13:19

## 2020-03-05 RX ADMIN — IPRATROPIUM BROMIDE AND ALBUTEROL SULFATE 1 AMPULE: .5; 3 SOLUTION RESPIRATORY (INHALATION) at 10:55

## 2020-03-05 ASSESSMENT — PAIN SCALES - GENERAL: PAINLEVEL_OUTOF10: 0

## 2020-03-05 NOTE — H&P
History & Physical        Patient:  Miles Crabtree  YOB: 1960    MRN: [id number]     Acct: [de-identified]        Assessment and Plan:        1. Chronic obstructive pulmonary disease with acute lower respiratory infection: Start aggressive pulmonary toilet with duo nebs, Mucinex, IV antibiotics, corticosteroids  2. Diabetes mellitus: We will hold his home medications start him on insulin sliding scale and carb controlled diet  3. New right bundle branch block with left anterior fascicular block: We will consult cardiology for further evaluation  4. Influenza A will start Tamiflu  5. Hypoxia we will start oxygen protocol  6. Elevated lactate acid level will repeat    CC: Shortness of breath    HPI: 62-year-old male presents to the emergency department for evaluation of shortness of breath, currently no pain. Patient reports chest tightness shortness of breath nasal congestion coughing and wheezing onset last 2 days. He states during the night his shortness of breath is increased to use his inhaler 30 times. He has albuterol nebulizer treatment every hour with no relief patient reports shortness of breath with lying flat. He states he has a history of developing bronchitis in the winter. While at [facility] patient was given a steroid and DuoNeb treatment discharged home. EMS brought the patient in on a nonrebreather. Patient reports using 3 L at home. He has a past medical history of COPD and asthma    ROS (14 point review of systems completed. Pertinent positives noted. Otherwise ROS is negative) : Cough with sputum  Shortness of breath  Body aches fever      PMH:  Per HPI and       Diagnosis Date    Asthma     COPD (chronic obstructive pulmonary disease) ([facility] [address]      SHX:        Procedure Laterality Date    APPENDECTOMY      HERNIA REPAIR       FHX: History reviewed. No pertinent family history.   SOCHX:   Social History     Socioeconomic History    Marital status:  sputum  Cardiovascular:  Regular rate and rhythm with normal S1/S2 without murmurs, rubs or gallops. PMI non displaced  Abdomen: Soft, non-tender, non-distended with normal bowel sounds. No guarding, rebound. Musculoskeletal:  No clubbing, cyanosis or edema bilaterally. Full range of motion without deformity. Skin: Skin color, texture, turgor normal.  No rashes or lesions, or suspicious lesions. Neurologic:  Neurovascularly intact without any focal sensory/motor deficits. Cranial nerves: II-XII intact, grossly non-focal.  Psychiatric:  Alert and oriented, thought content appropriate, normal insight  Capillary Refill: Brisk,< 2 seconds   Peripheral Pulses: +2 palpable, equal bilaterally upper and lower extremities  Lymphatics: no lymphadenopathy    Data: (All radiographs, tracings, PFTs, and imaging are personally viewed and interpreted unless otherwise noted).  WBC 10.4   Platelets 256   Creatinine 0.8   CO2 22   EKG new right bundle branch block with left fascicular block  Recent Labs     03/05/20  1058   WBC 10.4   HGB 15.7   HCT 49.6         Recent Labs     03/05/20  1058      K 4.1   CL 99   CO2 22*   BUN 15   CREATININE 0.8   CALCIUM 9.7      Recent Labs     03/05/20  1058   AST 21   ALT 16   BILITOT 0.3   ALKPHOS 91      Recent Labs     03/05/20  1057   INR 1.03       No results for input(s): [place] in the last 72 hours. Radiology reports-   Xr Chest Portable    Result Date: 3/5/2020  PROCEDURE: XR CHEST PORTABLE CLINICAL INFORMATION: short of breath . COMPARISON: 9/10/2018 TECHNIQUE: Portable upright FINDINGS: Negative increased opacity right lower lobe may represent developing infiltrate. Heart size normal. Mediastinum is not widened. Pulmonary vessels are not congested. EKG leads overlie the chest.     Right lower lobe subsegmental atelectasis versus developing infiltrate **This report has been created using voice recognition software.   It may contain minor errors

## 2020-03-05 NOTE — ED NOTES
Bed: 023A  Expected date: 3/5/20  Expected time: 10:20 AM  Means of arrival: [facility]  Comments:     Lencho Lagos RN  03/05/20 1028

## 2020-03-05 NOTE — LETTER
[address] [address]  Phone: [phone]    No name on file. March 7, 2020     Patient: Hannah Clemens   YOB: 1960   Date of Visit: 3/5/2020       To Whom It May Concern: It is my medical opinion that Lashell Aldrich may return to work on 03/11/2020. He will need to complete his course of antibiotics plus 24 hours until no longer contagious. If you have any questions or concerns, please don't hesitate to call. Sincerely,        No name on file.

## 2020-03-05 NOTE — ED PROVIDER NOTES
CHIEF COMPLAINT       Chief Complaint   Patient presents with    Shortness of Breath       Nurses Notes reviewed and I agree except as noted in the HPI. HISTORY OF PRESENT ILLNESS    Giacomo Bal is a 63 y.o. male who presents to the emergency department for the evaluation of shortness of breath, currently in no pain. Patient reports chest tightness, shortness of breath, nasal congestion, coughing, and wheezing onset 2 days ago. He states during the night his shortness of breath increased. Patient reports using his inhaler 30 times and albuterol nebulizer treatment every house with no relief. Patient reports shortness of breath is increased when lying flat. Patient was then evaluated by his PCP on 3/4 and was sent to [facility]. Patient states he has a history of developing bronchitis in the winter. While at [facility], the patient was given a steroid and Duoneb treatment and discharged home. EMS brought the patient in on a non-rebreather. Patient states that he has oxygen that he can use at home, but denies using it in the last year. Patient reports using 3 liters when needed. Patient has a past medical history of COPD and Asthma. There are no other complaints, symptoms, or concerns on initial encounter. HPI provided by the patient. REVIEW OF SYSTEMS       Has had subjective fever, shortness of breath, coughing, generalized body aches. Remainder of review of systems is otherwise reviewed as negative. PAST MEDICAL HISTORY    has a past medical history of Asthma and COPD (chronic obstructive pulmonary disease) ([facility] [address] SURGICAL HISTORY      has a past surgical history that includes Appendectomy and hernia repair.     CURRENT MEDICATIONS       Previous Medications    ACETAZOLAMIDE (DIAMOX) 500 MG EXTENDED RELEASE CAPSULE    Take 500 mg by mouth 2 times daily    ALBUTEROL (PROVENTIL) (2.5 MG/3ML) 0.083% NEBULIZER SOLUTION    Take 2.5 mg by nebulization every 6 hours as needed for Wheezing    ALBUTEROL SULFATE (PROAIR HFA IN)    Inhale 1-2 puffs into the lungs as needed    BUDESONIDE-FORMOTEROL FUMARATE (SYMBICORT IN)    Inhale into the lungs    EMPAGLIFLOZIN-LINAGLIPTIN (GLYXAMBI) 10-5 MG TABS    Take 10 mg by mouth daily    GLIMEPIRIDE (AMARYL) 1 MG TABLET    Take 1 mg by mouth 2 times daily    METFORMIN (GLUCOPHAGE) 1000 MG TABLET    Take 1,000 mg by mouth 2 times daily (with meals)       ALLERGIES     is allergic to amoxicillin. FAMILY HISTORY     has no family status information on file. family history is not on file. SOCIAL HISTORY      reports that he has been smoking cigarettes. He has been smoking about 0.75 packs per day. He has never used smokeless tobacco. He reports that he does not drink alcohol or use drugs. PHYSICAL EXAM     INITIAL VITALS:  weight is 212 lb (96.2 kg). His oral temperature is 98.8 °F (37.1 °C). His blood pressure is 124/67 and his pulse is 90. His respiration is 20 and oxygen saturation is 92%. Constitutional: Ill-appearing  Eyes:  Pupils are equal and reactive, extraocular muscles intact   HENT:  Atraumatic appearing  oropharynx moist, no pharyngeal exudates. Neck- normal range of motion, no tenderness, supple   Respiratory: expiratory wheezing and diminishment    Cardiovascular: regular, no murmur  GI:  Non tender, no rigidity, rebound or guarding  :  No costovertebral angle tenderness   Musculoskeletal:  2/4 distal pulses, no pitting edema  Back- no tenderness  Integument: warm and dry        DIAGNOSTIC RESULTS     EKG: All EKG's are interpreted by the Emergency Department Physician who either signs or Co-signs this chart in the absence of a cardiologist.  EKG interpreted by me shows sinus rhythm rate 92, QRS of 170, QTc of 504, axis of -88. Bifascicular block.     RADIOLOGY: non-plain film images(s) such as CT, Ultrasound and MRI are read by the radiologist.  Chest x-ray interpreted by the radiologist suggesting pneumonia    LABS:   Labs Reviewed   RAPID INFLUENZA A/B ANTIGENS - Abnormal; Notable for the following components:       Result Value    Flu A Antigen POSITIVE (*)     All other components within normal limits   CBC WITH AUTO DIFFERENTIAL - Abnormal; Notable for the following components:    .6 (*)     MCHC 31.7 (*)     RDW-SD 51.8 (*)     MPV 9.0 (*)     Segs Absolute 9.1 (*)     Lymphocytes Absolute 0.4 (*)     All other components within normal limits   COMPREHENSIVE METABOLIC PANEL W/ REFLEX TO MG FOR LOW K - Abnormal; Notable for the following components:    Glucose 184 (*)     CO2 22 (*)     Total Protein 8.1 (*)     All other components within normal limits   LACTIC ACID, PLASMA - Abnormal; Notable for the following components:    Lactic Acid 3.9 (*)     All other components within normal limits   ANION GAP - Abnormal; Notable for the following components:    Anion Gap 17.0 (*)     All other components within normal limits   CULTURE, BLOOD 1   BRAIN NATRIURETIC PEPTIDE   PROTIME-INR   TROPONIN   GLOMERULAR FILTRATION RATE, ESTIMATED   OSMOLALITY   SCAN OF BLOOD SMEAR       EMERGENCY DEPARTMENT COURSE:   Vitals:    Vitals:    03/05/20 1039 03/05/20 1055 03/05/20 1056 03/05/20 1158   BP: 137/75   124/67   Pulse: 93   90   Resp: 20 20  20   Temp:   98.8 °F (37.1 °C)    TempSrc:   Oral    SpO2: 93% 91%  92%   Weight:    212 lb (96.2 kg)     He has tested positive for influenza A and actually does have evidence of pneumonia on his x-ray. He is also been noted to be hypoxic. I have arranged admission to the hospitalist.    CRITICAL CARE:   none         FINAL IMPRESSION      1.  Pneumonia of right lung due to infectious organism, unspecified part of lung          DISPOSITION/PLAN   Admitted    DISCHARGE MEDICATIONS:  New Prescriptions    No medications on file       (Please note that portions of this note were completed with a voice recognition program.  Efforts were made to edit the dictations but occasionally words are mis-transcribed.)    This

## 2020-03-05 NOTE — ED NOTES
ED to inpatient nurses report    Chief Complaint   Patient presents with    Shortness of Breath      Present to ED from home  LOC: alert and orientated to name, place, date  Vital signs   Vitals:    03/05/20 1158 03/05/20 1317 03/05/20 1439 03/05/20 1523   BP: 124/67 (!) 143/76 135/75 (!) 152/74   Pulse: 90 84 74 85   Resp: 20 23 22 29   Temp:       TempSrc:       SpO2: 92% 90% 93% 92%   Weight: 212 lb (96.2 kg)         Oxygen Baseline 3L/NC at night - pthasnot worn is a year but has been weraing it continuously the past few days    Current needs required 4L/NC Bipap/Cpap No  LDAs:   Peripheral IV 03/05/20 Left Hand (Active)   Site Assessment Clean; Intact;Dry 3/5/2020  2:39 PM   Line Status Normal saline locked 3/5/2020 12:00 PM   Dressing Status Clean;Dry; Intact 3/5/2020  2:39 PM   Dressing Intervention New 3/5/2020 10:49 AM     Mobility: Independent with oxygen  Pending ED orders: none  Present condition: stable, NC O2 at 4L.  Flu A positive    Electronically signed by Delfino Ramirez RN on 3/5/2020 at 4:48 PM     Stevan Mcclure RN  03/05/20 1650       Stevan Mcclure RN  03/05/20 1651

## 2020-03-05 NOTE — ED TRIAGE NOTES
Pt presents to ED via EMS c/c SOB for a couple of days. Pt states he was seen at [facility] yesterday, was given a Duoneb and steroid injection then discharged. Pt states he has O2 at home but has not had to used it in a year or so until these past couple of days. Pt states he has been using the home O2 at 3L/NC continuously. Pt was on NRB upon arrival to ED by EMS. See ambulance run sheet.

## 2020-03-06 LAB
ANION GAP SERPL CALCULATED.3IONS-SCNC: 12 MEQ/L (ref 8–16)
BASOPHILS # BLD: 0.2 %
BASOPHILS ABSOLUTE: 0 THOU/MM3 (ref 0–0.1)
BUN BLDV-MCNC: 22 MG/DL (ref 7–22)
CALCIUM SERPL-MCNC: 8.8 MG/DL (ref 8.5–10.5)
CHLORIDE BLD-SCNC: 104 MEQ/L (ref 98–111)
CO2: 21 MEQ/L (ref 23–33)
CREAT SERPL-MCNC: 1.1 MG/DL (ref 0.4–1.2)
EOSINOPHIL # BLD: 0 %
EOSINOPHILS ABSOLUTE: 0 THOU/MM3 (ref 0–0.4)
ERYTHROCYTE [DISTWIDTH] IN BLOOD BY AUTOMATED COUNT: 14 % (ref 11.5–14.5)
ERYTHROCYTE [DISTWIDTH] IN BLOOD BY AUTOMATED COUNT: 54 FL (ref 35–45)
GFR SERPL CREATININE-BSD FRML MDRD: 68 ML/MIN/1.73M2
GLUCOSE BLD-MCNC: 154 MG/DL (ref 70–108)
GLUCOSE BLD-MCNC: 177 MG/DL (ref 70–108)
GLUCOSE BLD-MCNC: 189 MG/DL (ref 70–108)
GLUCOSE BLD-MCNC: 212 MG/DL (ref 70–108)
HCT VFR BLD CALC: 48.7 % (ref 42–52)
HEMOGLOBIN: 15.1 GM/DL (ref 14–18)
IMMATURE GRANS (ABS): 0.07 THOU/MM3 (ref 0–0.07)
IMMATURE GRANULOCYTES: 0.7 %
LACTIC ACID: 0.9 MMOL/L (ref 0.5–2.2)
LACTIC ACID: 1.6 MMOL/L (ref 0.5–2.2)
LYMPHOCYTES # BLD: 3.4 %
LYMPHOCYTES ABSOLUTE: 0.3 THOU/MM3 (ref 1–4.8)
MCH RBC QN AUTO: 32.5 PG (ref 26–33)
MCHC RBC AUTO-ENTMCNC: 31 GM/DL (ref 32.2–35.5)
MCV RBC AUTO: 104.7 FL (ref 80–94)
MONOCYTES # BLD: 3.3 %
MONOCYTES ABSOLUTE: 0.3 THOU/MM3 (ref 0.4–1.3)
NUCLEATED RED BLOOD CELLS: 0 /100 WBC
PLATELET # BLD: 211 THOU/MM3 (ref 130–400)
PMV BLD AUTO: 9.1 FL (ref 9.4–12.4)
POTASSIUM REFLEX MAGNESIUM: 4.9 MEQ/L (ref 3.5–5.2)
PROCALCITONIN: 0.09 NG/ML (ref 0.01–0.09)
RBC # BLD: 4.65 MILL/MM3 (ref 4.7–6.1)
SEG NEUTROPHILS: 92.4 %
SEGMENTED NEUTROPHILS ABSOLUTE COUNT: 9.1 THOU/MM3 (ref 1.8–7.7)
SODIUM BLD-SCNC: 137 MEQ/L (ref 135–145)
WBC # BLD: 9.8 THOU/MM3 (ref 4.8–10.8)

## 2020-03-06 PROCEDURE — 36415 COLL VENOUS BLD VENIPUNCTURE: CPT

## 2020-03-06 PROCEDURE — 96366 THER/PROPH/DIAG IV INF ADDON: CPT

## 2020-03-06 PROCEDURE — 2580000003 HC RX 258: Performed by: INTERNAL MEDICINE

## 2020-03-06 PROCEDURE — G0378 HOSPITAL OBSERVATION PER HR: HCPCS

## 2020-03-06 PROCEDURE — 83605 ASSAY OF LACTIC ACID: CPT

## 2020-03-06 PROCEDURE — 82948 REAGENT STRIP/BLOOD GLUCOSE: CPT

## 2020-03-06 PROCEDURE — 6360000002 HC RX W HCPCS: Performed by: INTERNAL MEDICINE

## 2020-03-06 PROCEDURE — 99233 SBSQ HOSP IP/OBS HIGH 50: CPT | Performed by: INTERNAL MEDICINE

## 2020-03-06 PROCEDURE — 96372 THER/PROPH/DIAG INJ SC/IM: CPT

## 2020-03-06 PROCEDURE — 2700000000 HC OXYGEN THERAPY PER DAY

## 2020-03-06 PROCEDURE — 6370000000 HC RX 637 (ALT 250 FOR IP): Performed by: INTERNAL MEDICINE

## 2020-03-06 PROCEDURE — 85025 COMPLETE CBC W/AUTO DIFF WBC: CPT

## 2020-03-06 PROCEDURE — 80048 BASIC METABOLIC PNL TOTAL CA: CPT

## 2020-03-06 PROCEDURE — 94640 AIRWAY INHALATION TREATMENT: CPT

## 2020-03-06 PROCEDURE — 99253 IP/OBS CNSLTJ NEW/EST LOW 45: CPT | Performed by: INTERNAL MEDICINE

## 2020-03-06 PROCEDURE — 96376 TX/PRO/DX INJ SAME DRUG ADON: CPT

## 2020-03-06 PROCEDURE — 1200000003 HC TELEMETRY R&B

## 2020-03-06 PROCEDURE — 84145 PROCALCITONIN (PCT): CPT

## 2020-03-06 PROCEDURE — 94761 N-INVAS EAR/PLS OXIMETRY MLT: CPT

## 2020-03-06 RX ADMIN — METHYLPREDNISOLONE SODIUM SUCCINATE 40 MG: 40 INJECTION, POWDER, FOR SOLUTION INTRAMUSCULAR; INTRAVENOUS at 01:55

## 2020-03-06 RX ADMIN — METHYLPREDNISOLONE SODIUM SUCCINATE 40 MG: 40 INJECTION, POWDER, FOR SOLUTION INTRAMUSCULAR; INTRAVENOUS at 17:44

## 2020-03-06 RX ADMIN — OSELTAMIVIR PHOSPHATE 75 MG: 75 CAPSULE ORAL at 08:35

## 2020-03-06 RX ADMIN — ACETAZOLAMIDE 500 MG: 250 TABLET ORAL at 08:36

## 2020-03-06 RX ADMIN — GUAIFENESIN 600 MG: 600 TABLET, EXTENDED RELEASE ORAL at 21:52

## 2020-03-06 RX ADMIN — INSULIN LISPRO 1 UNITS: 100 INJECTION, SOLUTION INTRAVENOUS; SUBCUTANEOUS at 13:47

## 2020-03-06 RX ADMIN — SODIUM CHLORIDE, PRESERVATIVE FREE 10 ML: 5 INJECTION INTRAVENOUS at 21:53

## 2020-03-06 RX ADMIN — OSELTAMIVIR PHOSPHATE 75 MG: 75 CAPSULE ORAL at 21:52

## 2020-03-06 RX ADMIN — LEVOFLOXACIN 750 MG: 5 INJECTION, SOLUTION INTRAVENOUS at 13:44

## 2020-03-06 RX ADMIN — GUAIFENESIN 600 MG: 600 TABLET, EXTENDED RELEASE ORAL at 08:36

## 2020-03-06 RX ADMIN — INSULIN LISPRO 1 UNITS: 100 INJECTION, SOLUTION INTRAVENOUS; SUBCUTANEOUS at 17:45

## 2020-03-06 RX ADMIN — IPRATROPIUM BROMIDE AND ALBUTEROL SULFATE 1 AMPULE: .5; 3 SOLUTION RESPIRATORY (INHALATION) at 09:18

## 2020-03-06 RX ADMIN — IPRATROPIUM BROMIDE AND ALBUTEROL SULFATE 1 AMPULE: .5; 3 SOLUTION RESPIRATORY (INHALATION) at 14:10

## 2020-03-06 RX ADMIN — FAMOTIDINE 20 MG: 20 TABLET ORAL at 21:52

## 2020-03-06 RX ADMIN — SODIUM CHLORIDE, PRESERVATIVE FREE 10 ML: 5 INJECTION INTRAVENOUS at 08:44

## 2020-03-06 RX ADMIN — METHYLPREDNISOLONE SODIUM SUCCINATE 40 MG: 40 INJECTION, POWDER, FOR SOLUTION INTRAMUSCULAR; INTRAVENOUS at 08:36

## 2020-03-06 RX ADMIN — ACETAZOLAMIDE 500 MG: 250 TABLET ORAL at 21:53

## 2020-03-06 RX ADMIN — ENOXAPARIN SODIUM 40 MG: 40 INJECTION SUBCUTANEOUS at 17:44

## 2020-03-06 RX ADMIN — FAMOTIDINE 20 MG: 20 TABLET ORAL at 08:36

## 2020-03-06 RX ADMIN — IPRATROPIUM BROMIDE AND ALBUTEROL SULFATE 1 AMPULE: .5; 3 SOLUTION RESPIRATORY (INHALATION) at 21:31

## 2020-03-06 RX ADMIN — IPRATROPIUM BROMIDE AND ALBUTEROL SULFATE 1 AMPULE: .5; 3 SOLUTION RESPIRATORY (INHALATION) at 18:17

## 2020-03-06 ASSESSMENT — PAIN SCALES - GENERAL
PAINLEVEL_OUTOF10: 0
PAINLEVEL_OUTOF10: 0

## 2020-03-06 NOTE — PROGRESS NOTES
Pt admitted to  [phone] via wheelchair. Complaints: Shortness of breath. IV in left hand. IV site free of s/s of infection or infiltration. Vital signs obtained. Assessment and data collection initiated. Oriented to room. Policies and procedures for 4a explained All questions answered with no further questions at this time. Fall prevention and safety brochure discussed with patient. Pt denies need to notify PCP. Pt's spouse in room and aware of admission.    Speedy Felder 3/5/2020 7:55 PM

## 2020-03-06 NOTE — CARE COORDINATION
Home Care Services:  None  Patient expects to be discharged to:  home with wife  Expected Discharge date:  03/08/20  Follow Up Appointment: Best Day/ Time: Thursday AM    Patient Goals/Plan/Treatment Preferences: Met with pt today as he is sitting up in chair reading paper with O2 on. He states that he is feeling improved, feels the tx and steroids are really helping. He is from home with his spouse, whom he states is now feeling unwell too. They are self sufficient, basic needs met, no services however pt does have a nebulizer and O2 that he uses only prn at home. He has a PCP, he drives and he denies home going needs at present time. Transportation/Food Security/Housekeeping Addressed:  No issues identified.     Evaluation: no

## 2020-03-06 NOTE — CONSULTS
[address] [place] [address]                                  CONSULTATION    PATIENT NAME: Talisha Casillas                  :        1960  MED REC NO:   [id number]                           ROOM:       0025  ACCOUNT NO:   [de-identified]                           ADMIT DATE: 2020  PROVIDER:     Jo Claudio MD    CONSULT DATE:  2020    CARDIOLOGY CONSULTATION    REASON FOR CONSULTATION:  Abnormal EKG, right bundle branch block. CHIEF COMPLAINT:  Shortness of breath. HISTORY OF PRESENT ILLNESS:  The patient is a pleasant 69-year-old male  patient with past medical history of asthma, chronic obstructive  pulmonary disease, right bundle-branch block, diabetes mellitus,  influenza. The patient has past surgical history of appendectomy and  hernia repair. He had prior evaluation by Cardiology and was seen by  Dr. Светлана Flores in the past.  Echocardiogram in 2019 revealed normal  left ventricular systolic function with an ejection fraction of 55%. The patient also had [employer] nuclear stress test in 2019 which  revealed no evidence for ischemia. There were changes suggestive of  inferior wall infarct versus attenuation artifact. The patient  presented to the hospital with complaints of shortness of breath. He  describes having atypical chest discomfort with coughing. He has been  experiencing nasal congestion, coughing and wheezing for two days prior  to hospitalization. His EKG was found to be abnormal with right  bundle-branch block. Cardiology was consulted for further evaluation. PAST MEDICAL HISTORY:  As listed above in history of present illness. PAST SURGICAL HISTORY:  As listed above in history of present illness. FAMILY HISTORY:  Denies premature CAD. SOCIAL HISTORY:  The patient is . He has known history of  cigarette smoking. Denies alcohol or drug abuse.     ALLERGIES: AMOXICILLIN. HOME MEDICATIONS:  Per medication reconciliation form. REVIEW OF SYSTEMS:  Comprehensive 14-point review of systems was  completed. Pertinent positive findings as listed in history of present  illness including cough productive of yellowish sputum, shortness of  breath, body aches, and subjective fevers. PHYSICAL EXAMINATION:  VITAL SIGNS:  143/76, 84, 98.8, 23, 90%. GENERAL APPEARANCE:  No acute distress, cooperative with physical  examination. HEENT:  Normocephalic, atraumatic. NECK:  Supple, no JVD. CHEST:  Bilateral scattered rhonchi. A few expiratory wheezing. CARDIOVASCULAR:  Regular rhythm. No S3, No S4. No rubs, no gallops, no  murmurs. ABDOMEN:  Soft, nontender, nondistended. No organomegaly. EXTREMITIES:  No clubbing, no cyanosis, no edema. SKIN:  Warm and dry. NEUROLOGICAL:  Alert, oriented x3, grossly nonfocal.  PSYCHIATRIC:  Normal insight. Capillary refill less than 2 seconds. Peripheral pulses +2 bilaterally. LYMPHATICS:  No lymphadenopathy. DIAGNOSTICS:  White blood cell count 10.4, hemoglobin 15.7, and platelet  count 690. Sodium 138, creatinine 0.8, CO2 of 22, AST 21, INR 1.03. Chest x-ray revealed right lower lobe/subsegmental atelectasis versus  developing infiltrates. Troponin less than 0.01. EKG 03/05/2020  revealed evidence of sinus rhythm. Heart rate 92. Right bundle-branch  block,  milliseconds. Left anterior fascicular block was noted. ASSESSMENT:  This is a 56-year-old male patient with past medical  history as listed above. He has had prior cardiac study within the past  9 months including echocardiogram which revealed normal ejection  fraction with no regional wall motion abnormality. The patient also had  nuclear stress test in 07/2019 which was negative for ischemia. Cardiology was consulted for evaluation of abnormal EKG with a right  bundle-branch block.   Other studies also included tilt table test in  08/2019 which was

## 2020-03-06 NOTE — FLOWSHEET NOTE
03/05/20 1950   Provider Notification   Reason for Communication Review case  (notify of consult)   Provider Name Dr. Bill Zaragoza   Provider Notification Physician   Method of Communication Secure Message   Response Waiting for response   Notification Time 1950   Perfect serve message sent to Dr. Bill Zaragoza to notify of consult. Awaiting response.

## 2020-03-07 VITALS
RESPIRATION RATE: 18 BRPM | TEMPERATURE: 98.1 F | BODY MASS INDEX: 29.75 KG/M2 | SYSTOLIC BLOOD PRESSURE: 97 MMHG | OXYGEN SATURATION: 93 % | HEART RATE: 63 BPM | WEIGHT: 207.8 LBS | DIASTOLIC BLOOD PRESSURE: 56 MMHG | HEIGHT: 70 IN

## 2020-03-07 LAB
GLUCOSE BLD-MCNC: 128 MG/DL (ref 70–108)
GLUCOSE BLD-MCNC: 267 MG/DL (ref 70–108)
LEGIONELLA PNEUMOPHILIA AG, URINE: NORMAL
STREP PNEUMO AG, UR: NORMAL

## 2020-03-07 PROCEDURE — 6360000002 HC RX W HCPCS: Performed by: INTERNAL MEDICINE

## 2020-03-07 PROCEDURE — 6370000000 HC RX 637 (ALT 250 FOR IP): Performed by: INTERNAL MEDICINE

## 2020-03-07 PROCEDURE — 96366 THER/PROPH/DIAG IV INF ADDON: CPT

## 2020-03-07 PROCEDURE — G0378 HOSPITAL OBSERVATION PER HR: HCPCS

## 2020-03-07 PROCEDURE — 94760 N-INVAS EAR/PLS OXIMETRY 1: CPT

## 2020-03-07 PROCEDURE — 99239 HOSP IP/OBS DSCHRG MGMT >30: CPT | Performed by: INTERNAL MEDICINE

## 2020-03-07 PROCEDURE — 82948 REAGENT STRIP/BLOOD GLUCOSE: CPT

## 2020-03-07 PROCEDURE — 96376 TX/PRO/DX INJ SAME DRUG ADON: CPT

## 2020-03-07 PROCEDURE — 2580000003 HC RX 258: Performed by: INTERNAL MEDICINE

## 2020-03-07 PROCEDURE — 94640 AIRWAY INHALATION TREATMENT: CPT

## 2020-03-07 RX ORDER — OSELTAMIVIR PHOSPHATE 75 MG/1
75 CAPSULE ORAL 2 TIMES DAILY
Qty: 6 CAPSULE | Refills: 0 | Status: SHIPPED | OUTPATIENT
Start: 2020-03-07 | End: 2020-03-10

## 2020-03-07 RX ORDER — PREDNISONE 10 MG/1
TABLET ORAL
Qty: 23 TABLET | Refills: 0 | Status: SHIPPED | OUTPATIENT
Start: 2020-03-08 | End: 2021-08-28

## 2020-03-07 RX ORDER — GUAIFENESIN 600 MG/1
600 TABLET, EXTENDED RELEASE ORAL 2 TIMES DAILY PRN
Qty: 30 TABLET | Refills: 0 | Status: SHIPPED | OUTPATIENT
Start: 2020-03-07 | End: 2021-08-28

## 2020-03-07 RX ORDER — IPRATROPIUM BROMIDE AND ALBUTEROL SULFATE 2.5; .5 MG/3ML; MG/3ML
3 SOLUTION RESPIRATORY (INHALATION) EVERY 4 HOURS PRN
Qty: 360 ML | Refills: 0 | Status: ON HOLD | OUTPATIENT
Start: 2020-03-07 | End: 2021-09-11 | Stop reason: SDUPTHER

## 2020-03-07 RX ORDER — PREDNISONE 20 MG/1
40 TABLET ORAL DAILY
Status: DISCONTINUED | OUTPATIENT
Start: 2020-03-07 | End: 2020-03-07 | Stop reason: HOSPADM

## 2020-03-07 RX ORDER — LEVOFLOXACIN 750 MG/1
750 TABLET ORAL DAILY
Qty: 3 TABLET | Refills: 0 | Status: SHIPPED | OUTPATIENT
Start: 2020-03-08 | End: 2020-03-11

## 2020-03-07 RX ORDER — ACETAZOLAMIDE 250 MG/1
500 TABLET ORAL 2 TIMES DAILY
Qty: 90 TABLET | Refills: 3 | Status: SHIPPED | OUTPATIENT
Start: 2020-03-07 | End: 2021-08-28

## 2020-03-07 RX ORDER — FAMOTIDINE 20 MG/1
20 TABLET, FILM COATED ORAL 2 TIMES DAILY
Qty: 60 TABLET | Refills: 3 | Status: SHIPPED | OUTPATIENT
Start: 2020-03-07 | End: 2021-08-28

## 2020-03-07 RX ADMIN — LEVOFLOXACIN 750 MG: 5 INJECTION, SOLUTION INTRAVENOUS at 14:38

## 2020-03-07 RX ADMIN — IPRATROPIUM BROMIDE AND ALBUTEROL SULFATE 1 AMPULE: .5; 3 SOLUTION RESPIRATORY (INHALATION) at 07:52

## 2020-03-07 RX ADMIN — FAMOTIDINE 20 MG: 20 TABLET ORAL at 10:09

## 2020-03-07 RX ADMIN — Medication 10 ML: at 04:45

## 2020-03-07 RX ADMIN — SODIUM CHLORIDE, PRESERVATIVE FREE 10 ML: 5 INJECTION INTRAVENOUS at 10:10

## 2020-03-07 RX ADMIN — METHYLPREDNISOLONE SODIUM SUCCINATE 40 MG: 40 INJECTION, POWDER, FOR SOLUTION INTRAMUSCULAR; INTRAVENOUS at 04:45

## 2020-03-07 RX ADMIN — PREDNISONE 40 MG: 20 TABLET ORAL at 14:23

## 2020-03-07 RX ADMIN — IPRATROPIUM BROMIDE AND ALBUTEROL SULFATE 1 AMPULE: .5; 3 SOLUTION RESPIRATORY (INHALATION) at 16:43

## 2020-03-07 RX ADMIN — GUAIFENESIN 600 MG: 600 TABLET, EXTENDED RELEASE ORAL at 10:09

## 2020-03-07 RX ADMIN — OSELTAMIVIR PHOSPHATE 75 MG: 75 CAPSULE ORAL at 10:09

## 2020-03-07 RX ADMIN — IPRATROPIUM BROMIDE AND ALBUTEROL SULFATE 1 AMPULE: .5; 3 SOLUTION RESPIRATORY (INHALATION) at 12:20

## 2020-03-07 RX ADMIN — ACETAZOLAMIDE 500 MG: 250 TABLET ORAL at 10:09

## 2020-03-07 ASSESSMENT — PAIN SCALES - GENERAL
PAINLEVEL_OUTOF10: 0
PAINLEVEL_OUTOF10: 0

## 2020-03-07 NOTE — PROGRESS NOTES
Hospitalist Progress Note    Patient:  Cal Benitez      Unit/Bed:8B-25/025-A    YOB: 1960    MRN: [id number]       Acct: [de-identified]     PCP: Cathleen Loomis MD    Date of Admission: 3/5/2020    Assessment/Plan:    Acute Hypoxic respiratory failure, COPD: Due to Influenza A and possible superimposed RLL pneumonia. Started on tamiflu, steroids, levaquin, duonebs. Transition to PO steroids. Improving    Type 2 DM: SSI, CC diet. Monitor while on steroids. Lactic acidosis: due to above, resolved. Stop IVFs    RBBB: Cardiology was consulted, this is an old finding, had recent stress test. No further testing needed. Will follow up outpatient. Expected discharge date:  TBD  Disposition:    [x] Home       [] TCU       [] Rehab       [] Psych       [] SNF       [] [place]       [] Other-    Chief Complaint: SOB    Hospital Course: 69-year-old male presents to the emergency department for evaluation of shortness of breath, currently no pain. Patient reports chest tightness shortness of breath nasal congestion coughing and wheezing onset last 2 days. He states during the night his shortness of breath is increased to use his inhaler 30 times. He has albuterol nebulizer treatment every hour with no relief patient reports shortness of breath with lying flat. He states he has a history of developing bronchitis in the winter. While at [facility] patient was given a steroid and DuoNeb treatment discharged home. EMS brought the patient in on a nonrebreather. Patient reports using 3 L at home. He has a past medical history of COPD and asthma    Subjective (past 24 hours): Patient feeling much improved, feels SOB when he is due for a breathing treatment, confirmed he hasn't needed home oxygen for several months but did need it in the past. No fevers. Cough improved.        Medications:  Reviewed    Infusion Medications    dextrose      sodium chloride 75 mL/hr at Peripheral Pulses: +2 palpable, equal bilaterally       Labs:   Recent Labs     03/05/20  1058 03/06/20  0343   WBC 10.4 9.8   HGB 15.7 15.1   HCT 49.6 48.7    211     Recent Labs     03/05/20  1058 03/06/20  0343    137   K 4.1 4.9   CL 99 104   CO2 22* 21*   BUN 15 22   CREATININE 0.8 1.1   CALCIUM 9.7 8.8     Recent Labs     03/05/20  1058   AST 21   ALT 16   BILITOT 0.3   ALKPHOS 91     Recent Labs     03/05/20  1057   INR 1.03     No results for input(s): CKTOTAL, TROPONINI in the last 72 hours. Recent Labs     03/06/20  0343   PROCAL 0.09       Microbiology:      Urinalysis:    No results found for: Loreda Tod, BACTERIA, RBCUA, BLOODU, SPECGRAV, GLUCOSEU    Radiology:  XR CHEST PORTABLE   Final Result   Right lower lobe subsegmental atelectasis versus developing infiltrate            **This report has been created using voice recognition software. It may contain minor errors which are inherent in voice recognition technology. **      Final report electronically signed by Dr. Naomy Julio on 3/5/2020 11:30 AM          DVT prophylaxis: [x] Lovenox                                 [] SCDs                                 [] SQ Heparin                                 [] Encourage ambulation           [] Already on Anticoagulation     Code Status: Full Code    Tele:   [x] yes             [] no    Active Hospital Problems    Diagnosis Date Noted    Influenza A [J10.1] 03/05/2020    Pneumonia due to infectious organism [J18.9] 03/05/2020    Hypoxia [R09.02] 03/05/2020    Chronic obstructive pulmonary disease with acute lower respiratory infection ([address] [J44.0] 03/05/2020    Moderate asthma [J45.909] 03/05/2020    Elevated lactic acid level [R79.89] 03/05/2020    Type 2 diabetes mellitus, without long-term current use of insulin ([address] [E11.9] 03/05/2020    RBBB with left anterior fascicular block [I45.2] 03/05/2020       Electronically signed by Charo Batista MD on 3/7/2020 at 6:41 AM

## 2020-03-07 NOTE — DISCHARGE SUMMARY
every 6 hours as needed for Wheezing             Albuterol Sulfate (PROAIR HFA IN)  Inhale 1-2 puffs into the lungs as needed             Empagliflozin-Linagliptin (GLYXAMBI) 10-5 MG TABS  Take 10 mg by mouth daily             finasteride (PROSCAR) 5 MG tablet  Take 5 mg by mouth daily             glimepiride (AMARYL) 1 MG tablet  Take 1 mg by mouth 2 times daily             metFORMIN (GLUCOPHAGE) 1000 MG tablet  Take 1,000 mg by mouth 2 times daily (with meals)                     Physical Examination     Vitals:  Vitals:    03/07/20 0752 03/07/20 1005 03/07/20 1141 03/07/20 1220   BP:  (!) 122/58 (!) 97/56    Pulse:  62 63    Resp: 18 18 18 18   Temp:   98.1 °F (36.7 °C)    TempSrc:   Oral    SpO2: 94% 94% 93% 93%   Weight:       Height:           Weight: Weight: 207 lb 12.8 oz (94.3 kg)     24 hour intake/output:    Intake/Output Summary (Last 24 hours) at 3/7/2020 1243  Last data filed at 3/7/2020 0437  Gross per 24 hour   Intake 1005 ml   Output --   Net 1005 ml       General appearance:  No apparent distress. Sitting in chair  HEENT: Normocephalic. PERRL. Extraocular motion intact. Conjunctivae clear. Nose symmetric without evidence of discharge. Oral mucosa moist w/o erythema or exudate. Neck: Supple. No JVD. No carotid bruits. Trachea midline. No thyromegaly. Cardiovascular:  RRR w/ normal S1/S2. No murmurs, rubs or gallops. Respiratory: Clear to auscultation mostly in posterior lung fields, with mild crackles at the lung bases without wheezes. Abdomen: Soft, non-tender, non-distended with normal bowel sounds. Musculoskeletal:  Full ROM without deformity. Neurologic:  No focal sensory/motor deficits. Cranial nerves: II-XII intact. Lymphatic: No cervical lymphadenopathy. Psychiatric:  Alert and oriented. Vascular: Dorsalis pedis pulses bilaterally palpable 2+. Radial pulses palpable bilaterally 2+. No peripheral edema. Capillary refill<3 seconds  Genitourinary: Deferred.    Skin: No visible rashes or lesions. Labs     Labs: For convenience and continuity at follow-up the following most recent labs are provided:      CBC:    Lab Results   Component Value Date    WBC 9.8 03/06/2020    HGB 15.1 03/06/2020    HCT 48.7 03/06/2020     03/06/2020       Renal:    Lab Results   Component Value Date     03/06/2020    K 4.9 03/06/2020     03/06/2020    CO2 21 03/06/2020    BUN 22 03/06/2020    CREATININE 1.1 03/06/2020    CALCIUM 8.8 03/06/2020           Radiology     Xr Chest Portable    Result Date: 3/5/2020  PROCEDURE: XR CHEST PORTABLE CLINICAL INFORMATION: short of breath . COMPARISON: 9/10/2018 TECHNIQUE: Portable upright FINDINGS: Negative increased opacity right lower lobe may represent developing infiltrate. Heart size normal. Mediastinum is not widened. Pulmonary vessels are not congested. EKG leads overlie the chest.     Right lower lobe subsegmental atelectasis versus developing infiltrate **This report has been created using voice recognition software. It may contain minor errors which are inherent in voice recognition technology. ** Final report electronically signed by Dr. Freddy Singh on 3/5/2020 11:30 AM        Consults     IP CONSULT TO CARDIOLOGY          Discharge Instructions     Patient Instructions:    Discharge lab work: None  Activity: As tolerated  Diet: DIET CARB CONTROL; Follow-up visits     Maricarmen Garves MD  [place] ,[place]  [phone]               Code status at time of discharge     Full Code       Time Spent on discharge is more than 45 minutes in the examination, evaluation, counseling and review of medications and discharge plan. Signed: Thank you Maricarmen Graves MD for the opportunity to be involved in this patient's care.     Electronically signed by Ruy Francisco MD on 3/7/2020 at 12:43 PM

## 2020-03-07 NOTE — PLAN OF CARE
Problem: Falls - Risk of:  Goal: Will remain free from falls  Description: Will remain free from falls  Outcome: Ongoing  Note: Patient absent of falls this shift, fall band intact, bed alarm set, falling star magnet in place. Problem: Discharge Planning:  Goal: Participates in care planning  Description: Participates in care planning  Outcome: Ongoing  Note: Patient actively participates in their care. Problem: Airway Clearance - Ineffective:  Goal: Ability to maintain a clear airway will improve  Description: Ability to maintain a clear airway will improve  Outcome: Ongoing  Note: Patient has adequate oxygenation this shift. Patient has 2 liters this shift. PRN breathing treatments given as needed. Problem: Gas Exchange - Impaired:  Goal: Levels of oxygenation will improve  Description: Levels of oxygenation will improve  Outcome: Ongoing     Problem: Pain:  Goal: Pain level will decrease  Description: Pain level will decrease  Outcome: Ongoing  Note: Patient voices pain 0/10. Patients pain goal is 0/10. PRN pain medications given as ordered. Patients pain goal is a 0. Non-pharmacological interventions include: rest.       Problem: Impaired respiratory status  Goal: Clear lung sounds  Outcome: Ongoing  Note: Lung sounds diminished this shift with some expiratory wheezes. Breathing Treatments scheduled     Care plan reviewed with patient. Patient verbalize understanding of the plan of care and contribute to goal setting.

## 2020-03-07 NOTE — PROCEDURES
A home oxygen evaluation has been completed. [x]Patient is an inpatient. It is expected that the patient will be discharged within the next 48 hours. Qualified provider to write orders for possible sleep study or home oxygen prescription. Social service/care managers will arrange for home oxygen if ordered. If patient is active, arrange for Home Medical supplier to assess for Oxygen Conserving Device per pulse oximetry. []Patient is an outpatient. Results will be faxed to the ordering provider. Qualified provider to write orders for possible sleep study or home oxygen prescription. Patient was been on room air for about 2 hours per the nurse. SpO2 was 94% on room air at rest. Patients SpO2 was 89% or above and did not qualify for home oxygen. Patient was walked for 7 minutes. SpO2 was 92% during walking. Patients SpO2 was 89% or above and did not qualify for home oxygen. Patient will not be set up/instructed on a nocturnal study- the order says the nocturnal part of the home O2 eval is not needed.

## 2020-03-07 NOTE — DISCHARGE INSTR - DIET

## 2020-03-07 NOTE — PROGRESS NOTES
Pt discharged home with wife. He has all personal belongings with him including home medications. He has return to work slip and AVS. Both were explained to him in detail. He states he has no questions at this time.

## 2020-03-11 LAB — BLOOD CULTURE, ROUTINE: NORMAL

## 2020-04-04 PROBLEM — J10.1 INFLUENZA A: Status: RESOLVED | Noted: 2020-03-05 | Resolved: 2020-04-04

## 2020-08-24 ENCOUNTER — HOSPITAL ENCOUNTER (OUTPATIENT)
Dept: CT IMAGING | Age: 60
Discharge: HOME | End: 2020-08-24
Payer: COMMERCIAL

## 2020-08-24 PROCEDURE — G0297 LDCT FOR LUNG CA SCREEN: HCPCS

## 2021-08-28 ENCOUNTER — HOSPITAL ENCOUNTER (EMERGENCY)
Age: 61
Discharge: HOME | End: 2021-08-28
Payer: COMMERCIAL

## 2021-08-28 ENCOUNTER — APPOINTMENT (OUTPATIENT)
Dept: GENERAL RADIOLOGY | Age: 61
End: 2021-08-28
Payer: COMMERCIAL

## 2021-08-28 VITALS
SYSTOLIC BLOOD PRESSURE: 141 MMHG | TEMPERATURE: 97.7 F | RESPIRATION RATE: 20 BRPM | HEART RATE: 84 BPM | OXYGEN SATURATION: 95 % | DIASTOLIC BLOOD PRESSURE: 70 MMHG | BODY MASS INDEX: 31.57 KG/M2 | WEIGHT: 220 LBS

## 2021-08-28 DIAGNOSIS — U07.1 COVID-19: Primary | ICD-10-CM

## 2021-08-28 LAB
FLU A ANTIGEN: NEGATIVE
FLU B ANTIGEN: NEGATIVE
SARS-COV-2, NAA: DETECTED

## 2021-08-28 PROCEDURE — 87635 SARS-COV-2 COVID-19 AMP PRB: CPT

## 2021-08-28 PROCEDURE — 71046 X-RAY EXAM CHEST 2 VIEWS: CPT

## 2021-08-28 PROCEDURE — 99213 OFFICE O/P EST LOW 20 MIN: CPT

## 2021-08-28 PROCEDURE — 99214 OFFICE O/P EST MOD 30 MIN: CPT | Performed by: NURSE PRACTITIONER

## 2021-08-28 PROCEDURE — 87804 INFLUENZA ASSAY W/OPTIC: CPT

## 2021-08-28 RX ORDER — BUDESONIDE AND FORMOTEROL FUMARATE DIHYDRATE 80; 4.5 UG/1; UG/1
2 AEROSOL RESPIRATORY (INHALATION) 4 TIMES DAILY
COMMUNITY

## 2021-08-28 RX ORDER — DEXAMETHASONE 6 MG/1
6 TABLET ORAL 2 TIMES DAILY WITH MEALS
Qty: 14 TABLET | Refills: 0 | Status: SHIPPED | OUTPATIENT
Start: 2021-08-28 | End: 2021-09-04

## 2021-08-28 ASSESSMENT — ENCOUNTER SYMPTOMS
COUGH: 1
SINUS PRESSURE: 0
DIARRHEA: 0
SINUS PAIN: 0
EYE ITCHING: 0
ABDOMINAL PAIN: 0
SORE THROAT: 0
TROUBLE SWALLOWING: 0
RHINORRHEA: 0
WHEEZING: 0
VOMITING: 0
EYE DISCHARGE: 0
EYE PAIN: 0
SHORTNESS OF BREATH: 0
NAUSEA: 0

## 2021-08-28 NOTE — ED TRIAGE NOTES
Pt walked to room 7. Pt here with complaints of a cough, chest congestion. Started yesterday. Wife was here yesterday.

## 2021-08-28 NOTE — ED PROVIDER NOTES
[facility]  Urgent Care Encounter       CHIEF COMPLAINT       Chief Complaint   Patient presents with    Cough     Cough and chest congestion. Started yesterday. Nurses Notes reviewed and I agree except as noted in the HPI. HISTORY OF PRESENT ILLNESS   Brianda Hitchcock is a 57 y.o. male who presents with complaints of cough, chest congestion, and headache. Patient states that yesterday around 11 AM he started getting a cough. Patient states that he checked his oxygen level and he was at 90%. Patient states that he took albuterol treatment which helped his oxygen raise to 95%. After having breathing treatments done, he has coughed up white sputum. Patient states that he has COPD and was hospitalized in 2020 with pneumonia. Patient states that he wanted to catch this early this time and get proper treatment. Patient states that his wife was in yesterday for similar complaints. She was diagnosed with an upper respiratory infection and given antibiotics. Patient denies any chest pain, fever, fatigue, or body aches. The history is provided by the patient. REVIEW OF SYSTEMS     Review of Systems   Constitutional: Negative for chills, diaphoresis, fatigue and fever. HENT: Positive for congestion (chest). Negative for ear discharge, ear pain, hearing loss, postnasal drip, rhinorrhea, sinus pressure, sinus pain, sneezing, sore throat and trouble swallowing. Eyes: Negative for pain, discharge and itching. Respiratory: Positive for cough. Negative for shortness of breath and wheezing. Cardiovascular: Negative for chest pain. Gastrointestinal: Negative for abdominal pain, diarrhea, nausea and vomiting. Genitourinary: Negative for difficulty urinating and dysuria. Musculoskeletal: Negative for arthralgias and myalgias. Skin: Negative for rash. Allergic/Immunologic: Negative for environmental allergies. Neurological: Positive for headaches.  Negative for lb (99.8 kg). ,No LMP for male patient. Physical Exam  Vitals and nursing note reviewed. Constitutional:       General: He is not in acute distress. Appearance: Normal appearance. He is well-developed. He is not diaphoretic. HENT:      Head: Normocephalic. Right Ear: Tympanic membrane, ear canal and external ear normal. There is no impacted cerumen. Left Ear: Tympanic membrane, ear canal and external ear normal. There is no impacted cerumen. Nose: Nose normal. No congestion or rhinorrhea. Mouth/Throat:      Mouth: Mucous membranes are moist.      Pharynx: Oropharynx is clear. Posterior oropharyngeal erythema present. Eyes:      Conjunctiva/sclera:      Right eye: Right conjunctiva is not injected. Left eye: Left conjunctiva is not injected. Pupils: Pupils are equal, round, and reactive to light. Pupils are equal.   Cardiovascular:      Rate and Rhythm: Normal rate and regular rhythm. Pulses: Normal pulses. Heart sounds: Normal heart sounds. No murmur heard. Pulmonary:      Effort: Pulmonary effort is normal. No respiratory distress. Breath sounds: Decreased breath sounds present. Abdominal:      General: Abdomen is flat. Palpations: Abdomen is soft. Musculoskeletal:      Cervical back: Normal range of motion. Right knee: Normal range of motion. Left knee: Normal range of motion. Skin:     General: Skin is warm. Findings: No rash. Neurological:      General: No focal deficit present. Mental Status: He is alert and oriented to person, place, and time. Psychiatric:         Behavior: Behavior normal. Behavior is cooperative.          DIAGNOSTIC RESULTS     Labs:  Results for orders placed or performed during the hospital encounter of 08/28/21   COVID-19, Rapid   Result Value Ref Range    SARS-CoV-2, CHRISTO DETECTED (AA) NOT DETECTED   Rapid influenza A/B antigens   Result Value Ref Range    Flu A Antigen Negative NEGATIVE    Flu B Antigen Negative NEGATIVE       IMAGING:    XR CHEST (2 VW)   Final Result   Stable radiographic appearance of the chest. No evidence of an acute process. **This report has been created using voice recognition software. It may contain minor errors which are inherent in voice recognition technology. **      Final report electronically signed by Dr. Zakiya Falcon on 8/28/2021 9:07 AM            EKG:  none    URGENT CARE COURSE:     Vitals:    08/28/21 0815   BP: (!) 141/70   Pulse: 84   Resp: 20   Temp: 97.7 °F (36.5 °C)   TempSrc: Temporal   SpO2: 95%   Weight: 220 lb (99.8 kg)       Medications - No data to display         PROCEDURES:  None    FINAL IMPRESSION      1. COVID-19          DISPOSITION/ PLAN     Patient's exam findings are consistent with COVID-19. Patient tested positive for COVID-19. Patient has been given a prescription for Decadron and advised to take as prescribed. Patient instructed to continue using albuterol inhaler and nebulizer treatments as needed. Patient instructed to continue to monitor his oxygen level at home and if oxygen consistently stays at or below 90% or if he has any shortness of breath to go to the emergency department. Patient's primary care provider Dr. Massimo Chicas was paged and informed that this patient needs to be followed up with in 2 days. Patient's x-ray reading is negative for any pneumonia per radiologist reading. Patient tested negative for influenza. Patient also given the Covid regimen for vitamins,   -Zinc 30mg daily  -Pepcid 20 mg daily  -Vitamin C 1000mg twice a day  -Vitamin D3 5000U daily  -Melatonin 10 mg at night  Patient is agreeable with this plan at this time.     PATIENT REFERRED TO:  Randa Cleveland MD  [address] [facility] [address] / [facility] [address]      DISCHARGE MEDICATIONS:  New Prescriptions    DEXAMETHASONE (DECADRON) 6 MG TABLET    Take 1 tablet by mouth 2 times daily (with meals) for 7 days       Discontinued Medications ACETAZOLAMIDE (DIAMOX) 250 MG TABLET    Take 2 tablets by mouth 2 times daily    FAMOTIDINE (PEPCID) 20 MG TABLET    Take 1 tablet by mouth 2 times daily    GUAIFENESIN (MUCINEX) 600 MG EXTENDED RELEASE TABLET    Take 1 tablet by mouth 2 times daily as needed for Congestion (Cough)    PREDNISONE (DELTASONE) 10 MG TABLET    40 mg daily from 3/8 to 3/10, 20 mg daily from 3/11 to 3/14, then 10 mg daily from 3/15 to 3/17, then stop.  Take with breakfast       Current Discharge Medication List          ADDY Corrales CNP    (Please note that portions of this note were completed with a voice recognition program. Efforts were made to edit the dictations but occasionally words are mis-transcribed.)           ADDY Corrales CNP  08/28/21 0914

## 2021-08-28 NOTE — ED NOTES
Pt discharged. Pt verbalized understanding of discharge instructions and scripts. Pt walked out per self. Pt in stable condition.      Jeanie Lanier, VINI  46/38/08 0175

## 2021-08-30 ENCOUNTER — CARE COORDINATION (OUTPATIENT)
Dept: CARE COORDINATION | Age: 61
End: 2021-08-30

## 2021-08-30 NOTE — CARE COORDINATION
Patient contacted regarding COVID-19 diagnosis. Discussed COVID-19 related testing which was available at this time. Test results were positive. Patient informed of results, if available? Patient aware of results prior to ACM f/u call being completed. Ambulatory Care Manager contacted the patient by telephone to perform post discharge assessment. Call within 2 business days of discharge: Yes. Verified name and  with patient as identifiers. Provided introduction to self, and explanation of the CTN/ACM role, and reason for call due to risk factors for infection and/or exposure to COVID-19. Symptoms reviewed with patient who verbalized the following symptoms: cough, no new symptoms and no worsening symptoms. Due to no new or worsening symptoms encounter was not routed to provider for escalation. Discussed follow-up appointments. If no appointment was previously scheduled, appointment scheduling offered: Patient has been in contact with his PCP and denied any need for further assistance. [facility] follow up appointment(s):   Future Appointments   Date Time Provider Joes Luis Ramírez   2021  7:20 AM STR CT IMAGING RM1  [facility] OUT EXP STR Radiolog     Non-[facility] follow up appointment(s): N/A     Non-face-to-face services provided:  Obtained and reviewed discharge summary and/or continuity of care documents  Education of patient/family/caregiver/guardian to support self-management-COVID-19 education reviewed with patient. Patient educated on signs/symptoms to report as well as the importance of early symptom recognition. ACM reviewed need to self quarantine and to f/u with local health department. Advance Care Planning:   Does patient have an Advance Directive:  reviewed and current. ACP note completed. Educated patient about risk for severe COVID-19 due to risk factors according to CDC guidelines.  ACM reviewed discharge instructions, medical action plan and red flag symptoms with the patient who verbalized understanding. Discussed COVID vaccination status: No.  Discussed exposure protocols and quarantine with CDC Guidelines. Patient was given an opportunity to verbalize any questions and concerns and agrees to contact ACM or health care provider for questions related to their healthcare. Reviewed and educated patient on any new and changed medications related to discharge diagnosis     Was patient discharged with a pulse oximeter? No - Pt. Had pulse ox prior to presenting to the . Discussed and confirmed pulse oximeter discharge instructions and when to notify provider or seek emergency care. Patient was educated on what to call and report and when to seek medical attention. ACM provided contact information. Plan for follow-up call in 3-5 days based on severity of symptoms and risk factors. Patient called for covid-19 f/u s/p recent  visit for c/o cough and congestion. Patient has past medical history of COPD and Diabetes. Patient denied any new/change/worsening of symptoms. Patient reported he is feeling better and he denied any questions re: his discharge instructions. Patient shared he has started decadron as directed and he has been in touch with his PCP re: his symptoms and recent elevated BS. ACM reviewed pulse ox education and signs/symptoms to report with patient and he verbalized understanding. Importance of need to self quarantine and f/u with local health department also reviewed. Patient verbalized understanding. Covid-19 education was reviewed with patient, and patient verbalized understanding. Patient was reminded to call covid-19 hotline number with any new symptoms or questions/concerns. Patient verbalized understanding and hotline information was provided. Patient denied any other questions, concerns, or needs.

## 2021-09-03 ENCOUNTER — CARE COORDINATION (OUTPATIENT)
Dept: CARE COORDINATION | Age: 61
End: 2021-09-03

## 2021-09-03 NOTE — CARE COORDINATION
Attempted to reach patient for continued covid-19 f/u s/p recent UC visit for c/o cough and congestion. Patient was not available at the time of my call and generic voicemail message was left asking patient to please return call to my direct number. Will continue to attempt to f/u with patient in the future.

## 2021-09-06 ENCOUNTER — HOSPITAL ENCOUNTER (INPATIENT)
Age: 61
LOS: 5 days | Discharge: HOME | DRG: 177 | End: 2021-09-11
Attending: INTERNAL MEDICINE | Admitting: FAMILY MEDICINE
Payer: COMMERCIAL

## 2021-09-06 ENCOUNTER — APPOINTMENT (OUTPATIENT)
Dept: GENERAL RADIOLOGY | Age: 61
DRG: 177 | End: 2021-09-06
Payer: COMMERCIAL

## 2021-09-06 DIAGNOSIS — E87.1 HYPONATREMIA: ICD-10-CM

## 2021-09-06 DIAGNOSIS — R09.02 HYPOXIA: ICD-10-CM

## 2021-09-06 DIAGNOSIS — J44.0 CHRONIC OBSTRUCTIVE PULMONARY DISEASE WITH ACUTE LOWER RESPIRATORY INFECTION (HCC): ICD-10-CM

## 2021-09-06 DIAGNOSIS — J12.82 PNEUMONIA DUE TO COVID-19 VIRUS: Primary | ICD-10-CM

## 2021-09-06 DIAGNOSIS — U07.1 PNEUMONIA DUE TO COVID-19 VIRUS: Primary | ICD-10-CM

## 2021-09-06 PROBLEM — J96.01 ACUTE HYPOXEMIC RESPIRATORY FAILURE DUE TO COVID-19 (HCC): Status: ACTIVE | Noted: 2021-09-06

## 2021-09-06 LAB
ALBUMIN SERPL-MCNC: 3.8 G/DL (ref 3.5–5.1)
ALP BLD-CCNC: 81 U/L (ref 38–126)
ALT SERPL-CCNC: 93 U/L (ref 11–66)
ANION GAP SERPL CALCULATED.3IONS-SCNC: 14 MEQ/L (ref 8–16)
AST SERPL-CCNC: 74 U/L (ref 5–40)
BASOPHILS # BLD: 0.5 %
BASOPHILS ABSOLUTE: 0 THOU/MM3 (ref 0–0.1)
BILIRUB SERPL-MCNC: 0.9 MG/DL (ref 0.3–1.2)
BUN BLDV-MCNC: 24 MG/DL (ref 7–22)
C-REACTIVE PROTEIN: 25.12 MG/DL (ref 0–1)
CALCIUM SERPL-MCNC: 10 MG/DL (ref 8.5–10.5)
CHLORIDE BLD-SCNC: 87 MEQ/L (ref 98–111)
CO2: 26 MEQ/L (ref 23–33)
CREAT SERPL-MCNC: 1.1 MG/DL (ref 0.4–1.2)
D-DIMER QUANTITATIVE: 675 NG/ML FEU (ref 0–500)
EOSINOPHIL # BLD: 0.1 %
EOSINOPHILS ABSOLUTE: 0 THOU/MM3 (ref 0–0.4)
ERYTHROCYTE [DISTWIDTH] IN BLOOD BY AUTOMATED COUNT: 14.3 % (ref 11.5–14.5)
ERYTHROCYTE [DISTWIDTH] IN BLOOD BY AUTOMATED COUNT: 53.6 FL (ref 35–45)
FERRITIN: 813 NG/ML (ref 22–322)
GFR SERPL CREATININE-BSD FRML MDRD: 68 ML/MIN/1.73M2
GLUCOSE BLD-MCNC: 176 MG/DL (ref 70–108)
GLUCOSE BLD-MCNC: 73 MG/DL (ref 70–108)
GLUCOSE BLD-MCNC: 77 MG/DL (ref 70–108)
HCT VFR BLD CALC: 51.7 % (ref 42–52)
HEMOGLOBIN: 17.2 GM/DL (ref 14–18)
IMMATURE GRANS (ABS): 0.16 THOU/MM3 (ref 0–0.07)
IMMATURE GRANULOCYTES: 1.9 %
LD: 268 U/L (ref 100–190)
LYMPHOCYTES # BLD: 6 %
LYMPHOCYTES ABSOLUTE: 0.5 THOU/MM3 (ref 1–4.8)
MCH RBC QN AUTO: 33.2 PG (ref 26–33)
MCHC RBC AUTO-ENTMCNC: 33.3 GM/DL (ref 32.2–35.5)
MCV RBC AUTO: 99.8 FL (ref 80–94)
MONOCYTES # BLD: 4 %
MONOCYTES ABSOLUTE: 0.3 THOU/MM3 (ref 0.4–1.3)
NUCLEATED RED BLOOD CELLS: 0 /100 WBC
OSMOLALITY CALCULATION: 257.8 MOSMOL/KG (ref 275–300)
PLATELET # BLD: 149 THOU/MM3 (ref 130–400)
PMV BLD AUTO: 9.6 FL (ref 9.4–12.4)
POTASSIUM REFLEX MAGNESIUM: 4.2 MEQ/L (ref 3.5–5.2)
PRO-BNP: 193.4 PG/ML (ref 0–900)
RBC # BLD: 5.18 MILL/MM3 (ref 4.7–6.1)
SEG NEUTROPHILS: 87.5 %
SEGMENTED NEUTROPHILS ABSOLUTE COUNT: 7.3 THOU/MM3 (ref 1.8–7.7)
SODIUM BLD-SCNC: 127 MEQ/L (ref 135–145)
TOTAL PROTEIN: 7.4 G/DL (ref 6.1–8)
TROPONIN T: < 0.01 NG/ML
WBC # BLD: 8.3 THOU/MM3 (ref 4.8–10.8)

## 2021-09-06 PROCEDURE — 2060000000 HC ICU INTERMEDIATE R&B

## 2021-09-06 PROCEDURE — 2700000000 HC OXYGEN THERAPY PER DAY

## 2021-09-06 PROCEDURE — 82728 ASSAY OF FERRITIN: CPT

## 2021-09-06 PROCEDURE — 2580000003 HC RX 258: Performed by: PHYSICIAN ASSISTANT

## 2021-09-06 PROCEDURE — 80053 COMPREHEN METABOLIC PANEL: CPT

## 2021-09-06 PROCEDURE — 85025 COMPLETE CBC W/AUTO DIFF WBC: CPT

## 2021-09-06 PROCEDURE — 83615 LACTATE (LD) (LDH) ENZYME: CPT

## 2021-09-06 PROCEDURE — 99223 1ST HOSP IP/OBS HIGH 75: CPT | Performed by: PHYSICIAN ASSISTANT

## 2021-09-06 PROCEDURE — 6370000000 HC RX 637 (ALT 250 FOR IP): Performed by: STUDENT IN AN ORGANIZED HEALTH CARE EDUCATION/TRAINING PROGRAM

## 2021-09-06 PROCEDURE — 6370000000 HC RX 637 (ALT 250 FOR IP): Performed by: PHYSICIAN ASSISTANT

## 2021-09-06 PROCEDURE — 6360000002 HC RX W HCPCS: Performed by: PHYSICIAN ASSISTANT

## 2021-09-06 PROCEDURE — 84484 ASSAY OF TROPONIN QUANT: CPT

## 2021-09-06 PROCEDURE — 85379 FIBRIN DEGRADATION QUANT: CPT

## 2021-09-06 PROCEDURE — 1200000003 HC TELEMETRY R&B

## 2021-09-06 PROCEDURE — 99285 EMERGENCY DEPT VISIT HI MDM: CPT

## 2021-09-06 PROCEDURE — 82948 REAGENT STRIP/BLOOD GLUCOSE: CPT

## 2021-09-06 PROCEDURE — 2580000003 HC RX 258: Performed by: STUDENT IN AN ORGANIZED HEALTH CARE EDUCATION/TRAINING PROGRAM

## 2021-09-06 PROCEDURE — 94640 AIRWAY INHALATION TREATMENT: CPT

## 2021-09-06 PROCEDURE — 86140 C-REACTIVE PROTEIN: CPT

## 2021-09-06 PROCEDURE — 93005 ELECTROCARDIOGRAM TRACING: CPT | Performed by: STUDENT IN AN ORGANIZED HEALTH CARE EDUCATION/TRAINING PROGRAM

## 2021-09-06 PROCEDURE — 36415 COLL VENOUS BLD VENIPUNCTURE: CPT

## 2021-09-06 PROCEDURE — 82803 BLOOD GASES ANY COMBINATION: CPT

## 2021-09-06 PROCEDURE — 71045 X-RAY EXAM CHEST 1 VIEW: CPT

## 2021-09-06 PROCEDURE — 83880 ASSAY OF NATRIURETIC PEPTIDE: CPT

## 2021-09-06 RX ORDER — IPRATROPIUM BROMIDE AND ALBUTEROL SULFATE 2.5; .5 MG/3ML; MG/3ML
1 SOLUTION RESPIRATORY (INHALATION) ONCE
Status: COMPLETED | OUTPATIENT
Start: 2021-09-06 | End: 2021-09-06

## 2021-09-06 RX ORDER — ONDANSETRON 4 MG/1
4 TABLET, ORALLY DISINTEGRATING ORAL EVERY 8 HOURS PRN
Status: DISCONTINUED | OUTPATIENT
Start: 2021-09-06 | End: 2021-09-11 | Stop reason: HOSPADM

## 2021-09-06 RX ORDER — IPRATROPIUM BROMIDE AND ALBUTEROL SULFATE 2.5; .5 MG/3ML; MG/3ML
3 SOLUTION RESPIRATORY (INHALATION) EVERY 4 HOURS PRN
Status: DISCONTINUED | OUTPATIENT
Start: 2021-09-06 | End: 2021-09-08

## 2021-09-06 RX ORDER — ACETAMINOPHEN 325 MG/1
650 TABLET ORAL EVERY 6 HOURS PRN
Status: DISCONTINUED | OUTPATIENT
Start: 2021-09-06 | End: 2021-09-11 | Stop reason: HOSPADM

## 2021-09-06 RX ORDER — BUDESONIDE AND FORMOTEROL FUMARATE DIHYDRATE 80; 4.5 UG/1; UG/1
2 AEROSOL RESPIRATORY (INHALATION) 4 TIMES DAILY
Status: DISCONTINUED | OUTPATIENT
Start: 2021-09-06 | End: 2021-09-07

## 2021-09-06 RX ORDER — SODIUM CHLORIDE 0.9 % (FLUSH) 0.9 %
5-40 SYRINGE (ML) INJECTION PRN
Status: DISCONTINUED | OUTPATIENT
Start: 2021-09-06 | End: 2021-09-11 | Stop reason: HOSPADM

## 2021-09-06 RX ORDER — ALBUTEROL SULFATE 90 UG/1
1 AEROSOL, METERED RESPIRATORY (INHALATION) EVERY 4 HOURS PRN
Status: DISCONTINUED | OUTPATIENT
Start: 2021-09-06 | End: 2021-09-09

## 2021-09-06 RX ORDER — NICOTINE POLACRILEX 4 MG
15 LOZENGE BUCCAL PRN
Status: DISCONTINUED | OUTPATIENT
Start: 2021-09-06 | End: 2021-09-11 | Stop reason: HOSPADM

## 2021-09-06 RX ORDER — DEXAMETHASONE 4 MG/1
6 TABLET ORAL DAILY
Status: DISCONTINUED | OUTPATIENT
Start: 2021-09-06 | End: 2021-09-11 | Stop reason: HOSPADM

## 2021-09-06 RX ORDER — SODIUM CHLORIDE 9 MG/ML
25 INJECTION, SOLUTION INTRAVENOUS PRN
Status: DISCONTINUED | OUTPATIENT
Start: 2021-09-06 | End: 2021-09-11 | Stop reason: HOSPADM

## 2021-09-06 RX ORDER — DEXTROSE MONOHYDRATE 25 G/50ML
12.5 INJECTION, SOLUTION INTRAVENOUS PRN
Status: DISCONTINUED | OUTPATIENT
Start: 2021-09-06 | End: 2021-09-11 | Stop reason: HOSPADM

## 2021-09-06 RX ORDER — FINASTERIDE 5 MG/1
5 TABLET, FILM COATED ORAL DAILY
Status: DISCONTINUED | OUTPATIENT
Start: 2021-09-06 | End: 2021-09-11 | Stop reason: HOSPADM

## 2021-09-06 RX ORDER — ACETAMINOPHEN 650 MG/1
650 SUPPOSITORY RECTAL EVERY 6 HOURS PRN
Status: DISCONTINUED | OUTPATIENT
Start: 2021-09-06 | End: 2021-09-11 | Stop reason: HOSPADM

## 2021-09-06 RX ORDER — POLYETHYLENE GLYCOL 3350 17 G/17G
17 POWDER, FOR SOLUTION ORAL DAILY PRN
Status: DISCONTINUED | OUTPATIENT
Start: 2021-09-06 | End: 2021-09-11 | Stop reason: HOSPADM

## 2021-09-06 RX ORDER — SODIUM CHLORIDE 9 MG/ML
1000 INJECTION, SOLUTION INTRAVENOUS CONTINUOUS
Status: DISCONTINUED | OUTPATIENT
Start: 2021-09-06 | End: 2021-09-11 | Stop reason: HOSPADM

## 2021-09-06 RX ORDER — ZINC SULFATE 50(220)MG
50 CAPSULE ORAL DAILY
Status: DISCONTINUED | OUTPATIENT
Start: 2021-09-06 | End: 2021-09-11 | Stop reason: HOSPADM

## 2021-09-06 RX ORDER — ASCORBIC ACID 500 MG
500 TABLET ORAL DAILY
Status: DISCONTINUED | OUTPATIENT
Start: 2021-09-06 | End: 2021-09-11 | Stop reason: HOSPADM

## 2021-09-06 RX ORDER — VITAMIN B COMPLEX
2000 TABLET ORAL DAILY
Status: DISCONTINUED | OUTPATIENT
Start: 2021-09-06 | End: 2021-09-11 | Stop reason: HOSPADM

## 2021-09-06 RX ORDER — DEXTROSE MONOHYDRATE 50 MG/ML
100 INJECTION, SOLUTION INTRAVENOUS PRN
Status: DISCONTINUED | OUTPATIENT
Start: 2021-09-06 | End: 2021-09-11 | Stop reason: HOSPADM

## 2021-09-06 RX ORDER — SODIUM CHLORIDE 0.9 % (FLUSH) 0.9 %
5-40 SYRINGE (ML) INJECTION EVERY 12 HOURS SCHEDULED
Status: DISCONTINUED | OUTPATIENT
Start: 2021-09-06 | End: 2021-09-11 | Stop reason: HOSPADM

## 2021-09-06 RX ORDER — ONDANSETRON 2 MG/ML
4 INJECTION INTRAMUSCULAR; INTRAVENOUS EVERY 6 HOURS PRN
Status: DISCONTINUED | OUTPATIENT
Start: 2021-09-06 | End: 2021-09-11 | Stop reason: HOSPADM

## 2021-09-06 RX ADMIN — DEXAMETHASONE 6 MG: 4 TABLET ORAL at 17:35

## 2021-09-06 RX ADMIN — IPRATROPIUM BROMIDE AND ALBUTEROL SULFATE 3 ML: .5; 3 SOLUTION RESPIRATORY (INHALATION) at 17:39

## 2021-09-06 RX ADMIN — IPRATROPIUM BROMIDE AND ALBUTEROL SULFATE 1 AMPULE: .5; 3 SOLUTION RESPIRATORY (INHALATION) at 12:03

## 2021-09-06 RX ADMIN — SODIUM CHLORIDE 1000 ML: 9 INJECTION, SOLUTION INTRAVENOUS at 12:33

## 2021-09-06 RX ADMIN — ENOXAPARIN SODIUM 30 MG: 30 INJECTION SUBCUTANEOUS at 20:20

## 2021-09-06 RX ADMIN — Medication 50 MG: at 21:55

## 2021-09-06 RX ADMIN — BUDESONIDE AND FORMOTEROL FUMARATE DIHYDRATE 2 PUFF: 80; 4.5 AEROSOL RESPIRATORY (INHALATION) at 19:53

## 2021-09-06 RX ADMIN — INSULIN LISPRO 1 UNITS: 100 INJECTION, SOLUTION INTRAVENOUS; SUBCUTANEOUS at 21:54

## 2021-09-06 RX ADMIN — Medication 2000 UNITS: at 17:35

## 2021-09-06 RX ADMIN — OXYCODONE HYDROCHLORIDE AND ACETAMINOPHEN 500 MG: 500 TABLET ORAL at 17:35

## 2021-09-06 RX ADMIN — SODIUM CHLORIDE, PRESERVATIVE FREE 10 ML: 5 INJECTION INTRAVENOUS at 20:21

## 2021-09-06 RX ADMIN — BUDESONIDE AND FORMOTEROL FUMARATE DIHYDRATE 2 PUFF: 80; 4.5 AEROSOL RESPIRATORY (INHALATION) at 17:40

## 2021-09-06 ASSESSMENT — PAIN SCALES - GENERAL
PAINLEVEL_OUTOF10: 0
PAINLEVEL_OUTOF10: 0

## 2021-09-06 ASSESSMENT — ENCOUNTER SYMPTOMS
WHEEZING: 1
ABDOMINAL DISTENTION: 0
COUGH: 1
VOMITING: 0
SINUS PAIN: 0
ABDOMINAL PAIN: 0
DIARRHEA: 0
SORE THROAT: 0
NAUSEA: 0
SHORTNESS OF BREATH: 1

## 2021-09-06 NOTE — ED PROVIDER NOTES
[address] [address]          Pt Name: Gina Piper  MRN: [id number]  [place] 1960  Date of evaluation: 9/6/2021  Treating Resident Physician: Elda Goyal DO  Supervising Physician: Dr. Max Willingham       Chief Complaint   Patient presents with    Shortness of Breath     History obtained from the patient. HISTORY OF PRESENT ILLNESS    HPI  Gina Piper is a 57 y.o. male who presents to the emergency department for evaluation of shortness of breath. The patient has no other acute complaints at this time. Mr. Shaji Shoemaker has a past medical history of COPD and T2DM, he was diagnosed with Covid on 8/28/2021 at urgent care. He was discharged with steroids at that time. He felt relatively well until 9/3/2021. He began to develop progressive cough and dyspnea. He has oxygen available at home for emergencies. He was requiring 3 L to maintain SPO2 90-95% at home. He has not had a Covid vaccine. He does have albuterol at home, but states it was not helping his breathing. He denies any fevers. He states decreased appetite, but is still drinking plenty of water. He denies any chest pain, lightheadedness/dizziness, or abdominal pain. No numbness or tingling in extremities. REVIEW OF SYSTEMS   Review of Systems   Constitutional: Positive for fatigue. Negative for appetite change, chills and fever. HENT: Positive for congestion. Negative for sinus pain and sore throat. Eyes: Negative for visual disturbance. Respiratory: Positive for cough, shortness of breath and wheezing. Cardiovascular: Negative for chest pain, palpitations and leg swelling. Gastrointestinal: Negative for abdominal distention, abdominal pain, diarrhea, nausea and vomiting. Genitourinary: Negative for dysuria. Musculoskeletal: Negative for neck stiffness. Skin: Negative for rash.    Neurological: Negative for dizziness, light-headedness and numbness. Psychiatric/Behavioral: The patient is not nervous/anxious.           PAST MEDICAL AND SURGICAL HISTORY     Past Medical History:   Diagnosis Date    Asthma     COPD (chronic obstructive pulmonary disease) ([facility] [address]      Past Surgical History:   Procedure Laterality Date    APPENDECTOMY      HERNIA REPAIR           MEDICATIONS     Current Facility-Administered Medications:     0.9 % sodium chloride infusion, 1,000 mL, IntraVENous, Continuous, Halle Killbuck, DO, Last Rate: 75 mL/hr at 21 1233, 1,000 mL at 21 1233    Current Outpatient Medications:     budesonide-formoterol (SYMBICORT) 80-4.5 MCG/ACT AERO, Inhale 2 puffs into the lungs 4 times daily, Disp: , Rfl:     aclidinium (TUDORZA PRESSAIR) 400 MCG/ACT AEPB inhaler, Inhale 1 puff into the lungs 2 times daily, Disp: , Rfl:     ipratropium-albuterol (DUONEB) 0.5-2.5 (3) MG/3ML SOLN nebulizer solution, Inhale 3 mLs into the lungs every 4 hours as needed for Shortness of Breath, Disp: 360 mL, Rfl: 0    finasteride (PROSCAR) 5 MG tablet, Take 5 mg by mouth daily, Disp: , Rfl:     metFORMIN (GLUCOPHAGE) 1000 MG tablet, Take 1,000 mg by mouth 2 times daily (with meals), Disp: , Rfl:     glimepiride (AMARYL) 1 MG tablet, Take 1 mg by mouth 2 times daily, Disp: , Rfl:     Empagliflozin-Linagliptin (GLYXAMBI) 10-5 MG TABS, Take 10 mg by mouth daily, Disp: , Rfl:     Albuterol Sulfate (PROAIR HFA IN), Inhale 1-2 puffs into the lungs as needed, Disp: , Rfl:     albuterol (PROVENTIL) (2.5 MG/3ML) 0.083% nebulizer solution, Take 2.5 mg by nebulization every 6 hours as needed for Wheezing, Disp: , Rfl:       SOCIAL HISTORY     Social History     Social History Narrative    Not on file     Social History     Tobacco Use    Smoking status: Former Smoker     Packs/day: 0.75     Types: Cigarettes     Quit date: 2014     Years since quittin.7    Smokeless tobacco: Never Used   Vaping Use    Vaping Use: Never used Substance Use Topics    Alcohol use: No    Drug use: No         ALLERGIES     Allergies   Allergen Reactions    Amoxicillin Swelling         FAMILY HISTORY   History reviewed. No pertinent family history. PREVIOUS RECORDS   Previous records reviewed: Has had COPD exacerbations in the past.        PHYSICAL EXAM     ED Triage Vitals [09/06/21 1111]   BP Temp Temp Source Pulse Resp SpO2 Height Weight   (!) 142/79 100 °F (37.8 °C) Oral 86 20 (!) 89 % -- 220 lb (99.8 kg)     Initial vital signs and nursing assessment reviewed and He has hypoxic. Mely Lewis Body mass index is 31.57 kg/m². Pulsoximetry is abnormal per my interpretation. Additional Vital Signs:  Vitals:    09/06/21 1233   BP: (!) 144/55   Pulse: 88   Resp: 14   Temp:    SpO2: 93%       Physical Exam  Constitutional:       General: He is not in acute distress. Appearance: He is obese. He is ill-appearing. HENT:      Head: Normocephalic and atraumatic. Mouth/Throat:      Pharynx: Oropharynx is clear. No oropharyngeal exudate. Eyes:      Extraocular Movements: Extraocular movements intact. Cardiovascular:      Rate and Rhythm: Normal rate and regular rhythm. Pulses: Normal pulses. Pulmonary:      Effort: Accessory muscle usage present. No tachypnea. Breath sounds: Examination of the right-upper field reveals wheezing. Examination of the left-upper field reveals wheezing. Examination of the right-middle field reveals wheezing. Examination of the left-middle field reveals wheezing. Examination of the right-lower field reveals decreased breath sounds. Examination of the left-lower field reveals decreased breath sounds. Decreased breath sounds and wheezing present. Abdominal:      General: Bowel sounds are normal.      Palpations: Abdomen is soft. Musculoskeletal:         General: Normal range of motion. Cervical back: Normal range of motion. Skin:     General: Skin is warm.    Neurological:      General: No focal deficit present. Mental Status: He is alert. Psychiatric:         Mood and Affect: Mood normal.         Behavior: Behavior normal.         MEDICAL DECISION MAKING   Initial Assessment:   1. Mr. Jhonny Mendez is a 30-year-old gentleman who presents for evaluation of progressive shortness of breath at home. He was diagnosed with Covid on 8/28 and discharged from urgent care with steroids. He had been feeling relatively well until 9/30/2021 he began having progressive dyspnea at home. He is requiring 3 L supplemental O2 to maintain 90-92% SPO2. Plan:    CBC, CMP, BNP, troponin, EKG, CXR, VBG   CRP, LDH, ferritin, D-dimer   DuoNebs   IV fluids    Given patient's hypoxia and past medical history of COPD, we will recommend admission. He does have hyponatremia and hyperglycemia noted on labs. Sensation of dyspnea has improved with treatment of DuoNebs. However, he is still having SPO2 90-93% on 3 L. Hospitalist contacted for admission and graciously accepts.     ED RESULTS   Laboratory results:  Labs Reviewed   CBC WITH AUTO DIFFERENTIAL - Abnormal; Notable for the following components:       Result Value    MCV 99.8 (*)     MCH 33.2 (*)     RDW-SD 53.6 (*)     Lymphocytes Absolute 0.5 (*)     Monocytes Absolute 0.3 (*)     Immature Grans (Abs) 0.16 (*)     All other components within normal limits   COMPREHENSIVE METABOLIC PANEL W/ REFLEX TO MG FOR LOW K - Abnormal; Notable for the following components:    BUN 24 (*)     Sodium 127 (*)     Chloride 87 (*)     AST 74 (*)     ALT 93 (*)     All other components within normal limits   GLOMERULAR FILTRATION RATE, ESTIMATED - Abnormal; Notable for the following components:    Est, Glom Filt Rate 68 (*)     All other components within normal limits   OSMOLALITY - Abnormal; Notable for the following components:    Osmolality Calc 257.8 (*)     All other components within normal limits   D-DIMER, QUANTITATIVE - Abnormal; Notable for the following components: D-Dimer, Quant 675.00 (*)     All other components within normal limits   BRAIN NATRIURETIC PEPTIDE   TROPONIN   ANION GAP   BLOOD GAS, VENOUS   FERRITIN   C-REACTIVE PROTEIN   LACTATE DEHYDROGENASE       Radiologic studies results:  XR CHEST PORTABLE   Final Result   Mild patchy areas of linear opacities and interstitial prominence at the left midlung and bilateral lung bases as evidence for pneumonitis. **This report has been created using voice recognition software. It may contain minor errors which are inherent in voice recognition technology. **      Final report electronically signed by Dr. Camilo Aguilera MD on 9/6/2021 11:49 AM          ED Medications administered this visit:   Medications   0.9 % sodium chloride infusion (1,000 mLs IntraVENous New Bag 9/6/21 1233)   ipratropium-albuterol (DUONEB) nebulizer solution 1 ampule (1 ampule Inhalation Given 9/6/21 1203)         ED COURSE        See MDM above for ED course. MEDICATION CHANGES     New Prescriptions    No medications on file         FINAL DISPOSITION     Final diagnoses:   Pneumonia due to COVID-19 virus   Hypoxia   Hyponatremia     Condition: condition: good  Dispo: Admit to covid stepdown      This transcription was electronically signed. Parts of this transcriptions may have been dictated by use of voice recognition software and electronically transcribed, and parts may have been transcribed with the assistance of an ED scribe. The transcription may contain errors not detected in proofreading. Please refer to my supervising physician's documentation if my documentation differs.     Electronically Signed: Joby Floyd DO, 09/06/21, 1:05 PM         Joby Floyd DO  Resident  09/06/21 6249

## 2021-09-06 NOTE — H&P
Hospitalist History and Physical          Patient: Rick Weiss  : 1960  MRN: [id number]     Acct: [de-identified]    PCP: Yazmin Moses MD  Date of Admission: 2021  Date of Service: Pt seen/examined on 21  and Admitted to Inpatient with expected LOS greater than two midnights due to medical therapy. Assessment/Plan:    1. Acute hypoxemic respiratory failure 2/2 recent COVID19 infection: recently diagnosed with COVID19 21- given steroids and improving but worsened after course was completed   Currently on 3L nasal cannula to maintain 93 - 95 spO2. Covid admission protocol in place:   Oral decadron 6 mg, Vitamin D, Vitamin C and Zinc   Anti-coagulation prophylaxis of Lovenox 30 mg     2. Acute COPD/asthma exacerbation: decadron ordered; albuterol, Symbicort and duoneb ordered. Currently sp02 95 on 3L nasal cannula- will progress to higher oxygen or other measures if needed. 3. Type 2 DM: oral medications held; low SSI ordered- placed on 5 gram carb controlled diet     4. Obesity: BMI 31.57 kg/m2              =======================================================================      Chief Complaint:  Shortness of breath    History Of Present Illness: Rick Weiss is a 57 y.o. male with PMHx of COPD, type 2 DM, asthma and obesity  who presents to [facility] with worsening shortness of breath following a recent diagnosis of COVID19 on 21. He was given a week long course of oral steroids and was feeling better until he completed them. On 9/3/21 he began to have worsening shortness of breath. He started using 3L of oxygen at home to maintain a SP02 of around 95. He has some residual productive cough since his initial diagnosis but denies chest pain, palpitations, numbness and tingling, dizziness, abdominal pain.          Past Medical History:        Diagnosis Date    Asthma     COPD (chronic obstructive pulmonary disease) ([address]        Past Surgical History:        Procedure Laterality Date    APPENDECTOMY      HERNIA REPAIR         Medications Prior to Admission:   Prior to Admission medications    Medication Sig Start Date End Date Taking? Authorizing Provider   budesonide-formoterol (SYMBICORT) 80-4.5 MCG/ACT AERO Inhale 2 puffs into the lungs 4 times daily    Historical Provider, MD   aclidinium (TUDORZA PRESSAIR) 400 MCG/ACT AEPB inhaler Inhale 1 puff into the lungs 2 times daily    Historical Provider, MD   ipratropium-albuterol (DUONEB) 0.5-2.5 (3) MG/3ML SOLN nebulizer solution Inhale 3 mLs into the lungs every 4 hours as needed for Shortness of Breath 3/7/20   Lisbeth Dubin, MD   finasteride (PROSCAR) 5 MG tablet Take 5 mg by mouth daily    Historical Provider, MD   metFORMIN (GLUCOPHAGE) 1000 MG tablet Take 1,000 mg by mouth 2 times daily (with meals)    Historical Provider, MD   glimepiride (AMARYL) 1 MG tablet Take 1 mg by mouth 2 times daily    Historical Provider, MD   Empagliflozin-Linagliptin (GLYXAMBI) 10-5 MG TABS Take 10 mg by mouth daily    Historical Provider, MD   Albuterol Sulfate (PROAIR HFA IN) Inhale 1-2 puffs into the lungs as needed    Historical Provider, MD   albuterol (PROVENTIL) (2.5 MG/3ML) 0.083% nebulizer solution Take 2.5 mg by nebulization every 6 hours as needed for Wheezing    Historical Provider, MD       Allergies:  Amoxicillin    Social History:    The patient currently lives at home with his wife. Tobacco use:   reports that he quit smoking about 6 years ago. His smoking use included cigarettes. He smoked 0.75 packs per day. He has never used smokeless tobacco.  Alcohol use:   reports no history of alcohol use. Drug use:  reports no history of drug use. Family History:  as follows:  History reviewed. No pertinent family history. Review of Systems:   Pertinent positives and negatives as noted in the HPI. All other systems reviewed and negative.     Physical Exam:    BP (!) 144/55   Pulse 88   Temp 100 °F (37.8 °C) (Oral)   Resp 14   Wt 220 lb (99.8 kg)   SpO2 93%   BMI 31.57 kg/m²       General appearance: ill appearing, non distressed, well developed, appears stated age. Eyes:  Pupils equal, round, and reactive to light. Conjunctivae/corneas clear. HENT: Head normal in appearance. External nares normal.  Oral mucosa moist without lesions. Hearing grossly intact. Neck: Supple, with full range of motion. Trachea midline. No gross JVD appreciated. Respiratory: some accessory muscle use and Increased respiratory effort with diminished breath sounds and wheezing in middle and lower lung fields   Cardiovascular: Normal rate, regular rhythm with normal S1/S2 without murmurs. No lower extremity edema. Abdomen: Obese Soft, non-tender, non-distended   Musculoskeletal: There is no joint swelling or tenderness. Normal tone. No abnormal movements. Skin: Warm and dry. No rashes or lesions. Neurologic:  No focal sensory/motor deficits in the upper and lower extremities. Psychiatric: Alert and oriented, normal insight and thought content. Capillary Refill: Brisk,< 3 seconds. Peripheral Pulses: +2 palpable, equal bilaterally. Labs:     Recent Labs     09/06/21  1127   WBC 8.3   HGB 17.2   HCT 51.7        Recent Labs     09/06/21  1127   *   K 4.2   CL 87*   CO2 26   BUN 24*   CREATININE 1.1   CALCIUM 10.0     Recent Labs     09/06/21  1127   AST 74*   ALT 93*   BILITOT 0.9   ALKPHOS 81     No results for input(s): INR in the last 72 hours. No results for input(s): Jadene Moh in the last 72 hours. No results found for: Mag Pickens, BACTERIA, RBCUA, BLOODU, [address], [address]      Radiology:     XR CHEST PORTABLE   Final Result   Mild patchy areas of linear opacities and interstitial prominence at the left midlung and bilateral lung bases as evidence for pneumonitis. **This report has been created using voice recognition software.  It may contain minor errors which are inherent in voice recognition technology. **      Final report electronically signed by Dr. Eugenia Lu MD on 9/6/2021 11:49 AM             EKG:  I have reviewed the EKG with the following interpretation: Sinus Rhythm with PVCs  EKG shows no ischemic changes from previous EKG 03/05/20      PT/OT Eval Status:  will be assessed  Diet: No diet orders on file  DVT prophylaxis: COVID prophylaxis   Code Status: Prior  Disposition: admit to [facility]    Thank you Aaron Harden MD for the opportunity to be involved in this patient's care.     Electronically signed by TODD Lind on 9/6/2021 at 2:24 PM.

## 2021-09-06 NOTE — ED NOTES
Patient is resting in bed with easy and unlabored respirations. Call light in reach. Side rails up x2. Patient denies further complaints or concerns. Will monitor.         Deangelo Aguilar RN  09/06/21 0583

## 2021-09-07 PROBLEM — N40.0 BPH (BENIGN PROSTATIC HYPERPLASIA): Status: ACTIVE | Noted: 2021-09-07

## 2021-09-07 PROBLEM — J44.9 COPD (CHRONIC OBSTRUCTIVE PULMONARY DISEASE) (HCC): Status: ACTIVE | Noted: 2021-09-07

## 2021-09-07 PROBLEM — J96.01 ACUTE RESPIRATORY FAILURE WITH HYPOXIA (HCC): Status: ACTIVE | Noted: 2021-09-07

## 2021-09-07 LAB
ALBUMIN SERPL-MCNC: 3.1 G/DL (ref 3.5–5.1)
ALP BLD-CCNC: 73 U/L (ref 38–126)
ALT SERPL-CCNC: 72 U/L (ref 11–66)
ANION GAP SERPL CALCULATED.3IONS-SCNC: 13 MEQ/L (ref 8–16)
AST SERPL-CCNC: 61 U/L (ref 5–40)
BASOPHILS # BLD: 0.5 %
BASOPHILS ABSOLUTE: 0 THOU/MM3 (ref 0–0.1)
BILIRUB SERPL-MCNC: 0.4 MG/DL (ref 0.3–1.2)
BUN BLDV-MCNC: 26 MG/DL (ref 7–22)
CALCIUM SERPL-MCNC: 9.1 MG/DL (ref 8.5–10.5)
CHLORIDE BLD-SCNC: 97 MEQ/L (ref 98–111)
CO2: 21 MEQ/L (ref 23–33)
CREAT SERPL-MCNC: 0.8 MG/DL (ref 0.4–1.2)
EKG ATRIAL RATE: 83 BPM
EKG P AXIS: 45 DEGREES
EKG P-R INTERVAL: 144 MS
EKG Q-T INTERVAL: 388 MS
EKG QRS DURATION: 164 MS
EKG QTC CALCULATION (BAZETT): 455 MS
EKG R AXIS: -84 DEGREES
EKG T AXIS: 63 DEGREES
EKG VENTRICULAR RATE: 83 BPM
EOSINOPHIL # BLD: 0 %
EOSINOPHILS ABSOLUTE: 0 THOU/MM3 (ref 0–0.4)
ERYTHROCYTE [DISTWIDTH] IN BLOOD BY AUTOMATED COUNT: 14.4 % (ref 11.5–14.5)
ERYTHROCYTE [DISTWIDTH] IN BLOOD BY AUTOMATED COUNT: 55.8 FL (ref 35–45)
GFR SERPL CREATININE-BSD FRML MDRD: > 90 ML/MIN/1.73M2
GLUCOSE BLD-MCNC: 128 MG/DL (ref 70–108)
GLUCOSE BLD-MCNC: 132 MG/DL (ref 70–108)
GLUCOSE BLD-MCNC: 213 MG/DL (ref 70–108)
GLUCOSE BLD-MCNC: 279 MG/DL (ref 70–108)
GLUCOSE BLD-MCNC: 283 MG/DL (ref 70–108)
HCT VFR BLD CALC: 52.6 % (ref 42–52)
HEMOGLOBIN: 16.8 GM/DL (ref 14–18)
IMMATURE GRANS (ABS): 0.2 THOU/MM3 (ref 0–0.07)
IMMATURE GRANULOCYTES: 3.6 %
LYMPHOCYTES # BLD: 4.9 %
LYMPHOCYTES ABSOLUTE: 0.3 THOU/MM3 (ref 1–4.8)
MCH RBC QN AUTO: 33.5 PG (ref 26–33)
MCHC RBC AUTO-ENTMCNC: 31.9 GM/DL (ref 32.2–35.5)
MCV RBC AUTO: 105 FL (ref 80–94)
MONOCYTES # BLD: 4.9 %
MONOCYTES ABSOLUTE: 0.3 THOU/MM3 (ref 0.4–1.3)
NUCLEATED RED BLOOD CELLS: 0 /100 WBC
PLATELET # BLD: 109 THOU/MM3 (ref 130–400)
PMV BLD AUTO: 9.4 FL (ref 9.4–12.4)
POTASSIUM REFLEX MAGNESIUM: 5.3 MEQ/L (ref 3.5–5.2)
RBC # BLD: 5.01 MILL/MM3 (ref 4.7–6.1)
SEG NEUTROPHILS: 86.1 %
SEGMENTED NEUTROPHILS ABSOLUTE COUNT: 4.8 THOU/MM3 (ref 1.8–7.7)
SODIUM BLD-SCNC: 131 MEQ/L (ref 135–145)
TOTAL PROTEIN: 6.8 G/DL (ref 6.1–8)
WBC # BLD: 5.6 THOU/MM3 (ref 4.8–10.8)

## 2021-09-07 PROCEDURE — 6370000000 HC RX 637 (ALT 250 FOR IP): Performed by: PHYSICIAN ASSISTANT

## 2021-09-07 PROCEDURE — 80053 COMPREHEN METABOLIC PANEL: CPT

## 2021-09-07 PROCEDURE — 94760 N-INVAS EAR/PLS OXIMETRY 1: CPT

## 2021-09-07 PROCEDURE — 82948 REAGENT STRIP/BLOOD GLUCOSE: CPT

## 2021-09-07 PROCEDURE — 6370000000 HC RX 637 (ALT 250 FOR IP): Performed by: HOSPITALIST

## 2021-09-07 PROCEDURE — 2700000000 HC OXYGEN THERAPY PER DAY

## 2021-09-07 PROCEDURE — 36415 COLL VENOUS BLD VENIPUNCTURE: CPT

## 2021-09-07 PROCEDURE — 6360000002 HC RX W HCPCS: Performed by: PHYSICIAN ASSISTANT

## 2021-09-07 PROCEDURE — 2060000000 HC ICU INTERMEDIATE R&B

## 2021-09-07 PROCEDURE — 2580000003 HC RX 258: Performed by: PHYSICIAN ASSISTANT

## 2021-09-07 PROCEDURE — 85025 COMPLETE CBC W/AUTO DIFF WBC: CPT

## 2021-09-07 PROCEDURE — 99233 SBSQ HOSP IP/OBS HIGH 50: CPT | Performed by: HOSPITALIST

## 2021-09-07 PROCEDURE — 94640 AIRWAY INHALATION TREATMENT: CPT

## 2021-09-07 RX ORDER — BUDESONIDE AND FORMOTEROL FUMARATE DIHYDRATE 80; 4.5 UG/1; UG/1
2 AEROSOL RESPIRATORY (INHALATION) 2 TIMES DAILY
Status: DISCONTINUED | OUTPATIENT
Start: 2021-09-07 | End: 2021-09-11 | Stop reason: HOSPADM

## 2021-09-07 RX ADMIN — ENOXAPARIN SODIUM 30 MG: 30 INJECTION SUBCUTANEOUS at 07:53

## 2021-09-07 RX ADMIN — BUDESONIDE AND FORMOTEROL FUMARATE DIHYDRATE 2 PUFF: 80; 4.5 AEROSOL RESPIRATORY (INHALATION) at 21:19

## 2021-09-07 RX ADMIN — Medication 2000 UNITS: at 07:52

## 2021-09-07 RX ADMIN — ENOXAPARIN SODIUM 30 MG: 30 INJECTION SUBCUTANEOUS at 20:59

## 2021-09-07 RX ADMIN — FINASTERIDE 5 MG: 5 TABLET, FILM COATED ORAL at 07:52

## 2021-09-07 RX ADMIN — OXYCODONE HYDROCHLORIDE AND ACETAMINOPHEN 500 MG: 500 TABLET ORAL at 07:52

## 2021-09-07 RX ADMIN — SODIUM CHLORIDE, PRESERVATIVE FREE 10 ML: 5 INJECTION INTRAVENOUS at 07:54

## 2021-09-07 RX ADMIN — INSULIN LISPRO 2 UNITS: 100 INJECTION, SOLUTION INTRAVENOUS; SUBCUTANEOUS at 20:59

## 2021-09-07 RX ADMIN — IPRATROPIUM BROMIDE AND ALBUTEROL SULFATE 3 ML: .5; 3 SOLUTION RESPIRATORY (INHALATION) at 17:33

## 2021-09-07 RX ADMIN — DEXAMETHASONE 6 MG: 4 TABLET ORAL at 07:52

## 2021-09-07 RX ADMIN — INSULIN LISPRO 2 UNITS: 100 INJECTION, SOLUTION INTRAVENOUS; SUBCUTANEOUS at 12:58

## 2021-09-07 RX ADMIN — INSULIN LISPRO 3 UNITS: 100 INJECTION, SOLUTION INTRAVENOUS; SUBCUTANEOUS at 18:01

## 2021-09-07 RX ADMIN — IPRATROPIUM BROMIDE AND ALBUTEROL SULFATE 3 ML: .5; 3 SOLUTION RESPIRATORY (INHALATION) at 13:55

## 2021-09-07 RX ADMIN — Medication 50 MG: at 07:52

## 2021-09-07 RX ADMIN — TIOTROPIUM BROMIDE INHALATION SPRAY 2 PUFF: 3.12 SPRAY, METERED RESPIRATORY (INHALATION) at 13:55

## 2021-09-07 RX ADMIN — IPRATROPIUM BROMIDE AND ALBUTEROL SULFATE 3 ML: .5; 3 SOLUTION RESPIRATORY (INHALATION) at 08:01

## 2021-09-07 ASSESSMENT — PAIN SCALES - GENERAL
PAINLEVEL_OUTOF10: 0

## 2021-09-07 NOTE — CARE COORDINATION
9/7/21, 1:59 PM EDT  DISCHARGE PLANNING EVALUATION:    Rick Weiss       Admitted: 9/6/2021/ [address] day: 1   Location: [facility]011-A Reason for admit: Hyponatremia [E87.1]  Hypoxia [R09.02]  Acute hypoxemic respiratory failure due to COVID-19 ([facility] [U07.1, J96.01]  Pneumonia due to COVID-19 virus [U07.1, J12.82]   PMH:  has a past medical history of Asthma and COPD (chronic obstructive pulmonary disease) ([facility] Procedure: none  Barriers to Discharge:  + covid on 8/28. Hospitalist following. Hx asthma, copd. Tmax 100.7. Decadron, Inhaler. Vit C, D, zinc. Sats dropping, 87% on 6L NC. Na+ 129. PCP: Yazmin Moses MD  Readmission Risk Score: 14%    Patient Goals/Plan/Treatment Preferences: Spoke with David Keith briefly, explained role of . Did not complete full assessment r/t respiratory status. He is from home with his wife and independent. Will follow. Transportation/Food Security/Housekeeping Addressed:  No issues identified.

## 2021-09-08 LAB
GLUCOSE BLD-MCNC: 163 MG/DL (ref 70–108)
GLUCOSE BLD-MCNC: 238 MG/DL (ref 70–108)
GLUCOSE BLD-MCNC: 254 MG/DL (ref 70–108)
GLUCOSE BLD-MCNC: 282 MG/DL (ref 70–108)

## 2021-09-08 PROCEDURE — 6360000002 HC RX W HCPCS: Performed by: PHYSICIAN ASSISTANT

## 2021-09-08 PROCEDURE — 2060000000 HC ICU INTERMEDIATE R&B

## 2021-09-08 PROCEDURE — 94640 AIRWAY INHALATION TREATMENT: CPT

## 2021-09-08 PROCEDURE — 6370000000 HC RX 637 (ALT 250 FOR IP): Performed by: PHYSICIAN ASSISTANT

## 2021-09-08 PROCEDURE — 2580000003 HC RX 258: Performed by: PHYSICIAN ASSISTANT

## 2021-09-08 PROCEDURE — 2700000000 HC OXYGEN THERAPY PER DAY

## 2021-09-08 PROCEDURE — 99232 SBSQ HOSP IP/OBS MODERATE 35: CPT | Performed by: HOSPITALIST

## 2021-09-08 PROCEDURE — 94761 N-INVAS EAR/PLS OXIMETRY MLT: CPT

## 2021-09-08 PROCEDURE — 82948 REAGENT STRIP/BLOOD GLUCOSE: CPT

## 2021-09-08 RX ORDER — ALBUTEROL SULFATE 90 UG/1
2 AEROSOL, METERED RESPIRATORY (INHALATION)
Status: DISCONTINUED | OUTPATIENT
Start: 2021-09-08 | End: 2021-09-11 | Stop reason: HOSPADM

## 2021-09-08 RX ADMIN — ALBUTEROL SULFATE 1 PUFF: 90 AEROSOL, METERED RESPIRATORY (INHALATION) at 13:13

## 2021-09-08 RX ADMIN — ENOXAPARIN SODIUM 30 MG: 30 INJECTION SUBCUTANEOUS at 08:34

## 2021-09-08 RX ADMIN — INSULIN LISPRO 2 UNITS: 100 INJECTION, SOLUTION INTRAVENOUS; SUBCUTANEOUS at 20:45

## 2021-09-08 RX ADMIN — INSULIN LISPRO 3 UNITS: 100 INJECTION, SOLUTION INTRAVENOUS; SUBCUTANEOUS at 11:52

## 2021-09-08 RX ADMIN — OXYCODONE HYDROCHLORIDE AND ACETAMINOPHEN 500 MG: 500 TABLET ORAL at 08:34

## 2021-09-08 RX ADMIN — INSULIN LISPRO 1 UNITS: 100 INJECTION, SOLUTION INTRAVENOUS; SUBCUTANEOUS at 09:52

## 2021-09-08 RX ADMIN — FINASTERIDE 5 MG: 5 TABLET, FILM COATED ORAL at 08:34

## 2021-09-08 RX ADMIN — SODIUM CHLORIDE, PRESERVATIVE FREE 10 ML: 5 INJECTION INTRAVENOUS at 21:09

## 2021-09-08 RX ADMIN — IPRATROPIUM BROMIDE AND ALBUTEROL SULFATE 3 ML: .5; 3 SOLUTION RESPIRATORY (INHALATION) at 03:46

## 2021-09-08 RX ADMIN — BUDESONIDE AND FORMOTEROL FUMARATE DIHYDRATE 2 PUFF: 80; 4.5 AEROSOL RESPIRATORY (INHALATION) at 20:42

## 2021-09-08 RX ADMIN — ENOXAPARIN SODIUM 30 MG: 30 INJECTION SUBCUTANEOUS at 21:09

## 2021-09-08 RX ADMIN — INSULIN LISPRO 2 UNITS: 100 INJECTION, SOLUTION INTRAVENOUS; SUBCUTANEOUS at 18:19

## 2021-09-08 RX ADMIN — Medication 2000 UNITS: at 08:34

## 2021-09-08 RX ADMIN — ALBUTEROL SULFATE 2 PUFF: 90 AEROSOL, METERED RESPIRATORY (INHALATION) at 16:41

## 2021-09-08 RX ADMIN — Medication 50 MG: at 08:34

## 2021-09-08 RX ADMIN — TIOTROPIUM BROMIDE INHALATION SPRAY 2 PUFF: 3.12 SPRAY, METERED RESPIRATORY (INHALATION) at 08:57

## 2021-09-08 RX ADMIN — ALBUTEROL SULFATE 2 PUFF: 90 AEROSOL, METERED RESPIRATORY (INHALATION) at 20:42

## 2021-09-08 RX ADMIN — BUDESONIDE AND FORMOTEROL FUMARATE DIHYDRATE 2 PUFF: 80; 4.5 AEROSOL RESPIRATORY (INHALATION) at 08:58

## 2021-09-08 RX ADMIN — IPRATROPIUM BROMIDE AND ALBUTEROL SULFATE 3 ML: .5; 3 SOLUTION RESPIRATORY (INHALATION) at 08:56

## 2021-09-08 RX ADMIN — DEXAMETHASONE 6 MG: 4 TABLET ORAL at 08:34

## 2021-09-08 ASSESSMENT — PAIN SCALES - GENERAL
PAINLEVEL_OUTOF10: 0
PAINLEVEL_OUTOF10: 0

## 2021-09-08 NOTE — PROGRESS NOTES
Hospitalist Progress Note    Patient:  Clarisa Trujillo      Unit/Bed:8A-11/011-A    YOB: 1960    MRN: [id number]       Acct: [de-identified]     PCP: John Trimble MD    Date of Admission: 9/6/2021      Subjective: Patient reports that he was diagnosed with COVID 1 week ago. Patient states he felt fine after getting his steroids. Patient states that when steroids ran out, he started to feel very short of breath. Patient notes that he has a difficult time being able to catch his breath at times. Patient states that when he gets his inhalers, he does feel better. Patient states he was told that he would get one of his inhalers 4 times a day. Patient states that he has not gotten it for 12 hours and he is concerned. No fever, no chills, no nausea, no vomiting. Medications:  Reviewed    Infusion Medications    sodium chloride Stopped (09/06/21 2200)    sodium chloride      dextrose       Scheduled Medications    budesonide-formoterol  2 puff Inhalation BID    tiotropium  2 puff Inhalation Daily    finasteride  5 mg Oral Daily    sodium chloride flush  5-40 mL IntraVENous 2 times per day    enoxaparin  30 mg SubCUTAneous BID    dexamethasone  6 mg Oral Daily    Vitamin D  2,000 Units Oral Daily    ascorbic acid  500 mg Oral Daily    zinc sulfate  50 mg Oral Daily    insulin lispro  0-6 Units SubCUTAneous TID WC    insulin lispro  0-3 Units SubCUTAneous Nightly     PRN Meds: albuterol sulfate HFA, ipratropium-albuterol, sodium chloride flush, sodium chloride, ondansetron **OR** ondansetron, polyethylene glycol, acetaminophen **OR** acetaminophen, glucose, dextrose, glucagon (rDNA), dextrose      Intake/Output Summary (Last 24 hours) at 9/7/2021 2102  Last data filed at 9/7/2021 2032  Gross per 24 hour   Intake 1390 ml   Output --   Net 1390 ml       Diet:  ADULT DIET;  Regular; 5 carb choices (75 gm/meal)    Exam:  BP (!) 155/75   Pulse 65   Temp 98.4 °F (36.9 °C) (Oral)   Resp 18   Ht 5' 10\" (1.778 m)   Wt 213 lb 3 oz (96.7 kg)   SpO2 91%   BMI 30.59 kg/m²   Physical Exam  Constitutional:       Appearance: Normal appearance. HENT:      Head: Normocephalic and atraumatic. Right Ear: External ear normal.      Left Ear: External ear normal.      Nose: Nose normal.      Mouth/Throat:      Pharynx: Oropharynx is clear. Eyes:      Extraocular Movements: Extraocular movements intact. Pupils: Pupils are equal, round, and reactive to light. Cardiovascular:      Rate and Rhythm: Normal rate and regular rhythm. Pulses: Normal pulses. Heart sounds: Normal heart sounds. No murmur heard. No friction rub. No gallop. Pulmonary:      Effort: Pulmonary effort is normal. No respiratory distress. Breath sounds: Normal breath sounds. No wheezing, rhonchi or rales. Abdominal:      General: There is no distension. Tenderness: There is no abdominal tenderness. Musculoskeletal:         General: No swelling. Normal range of motion. Cervical back: Normal range of motion and neck supple. Skin:     General: Skin is warm. Neurological:      General: No focal deficit present. Mental Status: He is alert. Labs:   Recent Labs     09/06/21  1127 09/07/21  0638   WBC 8.3 5.6   HGB 17.2 16.8   HCT 51.7 52.6*    109*     Recent Labs     09/06/21  1127 09/07/21  0638   * 131*   K 4.2 5.3*   CL 87* 97*   CO2 26 21*   BUN 24* 26*   CREATININE 1.1 0.8   CALCIUM 10.0 9.1     Recent Labs     09/06/21  1127 09/07/21  0638   AST 74* 61*   ALT 93* 72*   BILITOT 0.9 0.4   ALKPHOS 81 73     No results for input(s): INR in the last 72 hours. No results for input(s): Karis Lowers in the last 72 hours.     Urinalysis:    No results found for: Dc Laws, BACTERIA, RBCUA, BLOODU, [address], [address]    Radiology:  XR CHEST PORTABLE   Final Result   Mild patchy areas of linear opacities and interstitial prominence at the left midlung and bilateral lung bases as evidence for pneumonitis. **This report has been created using voice recognition software. It may contain minor errors which are inherent in voice recognition technology. **      Final report electronically signed by Dr. Victoriano Yang MD on 9/6/2021 11:49 AM          Diet: ADULT DIET; Regular; 5 carb choices (75 gm/meal)    DVT prophylaxis: [x] Lovenox                                 [] SCDs                                 [] SQ Heparin                                 [] Encourage ambulation           [] Already on Anticoagulation     Disposition:    [] Home       [] TCU       [] Rehab       [] Psych       [] SNF       [] [facility]       [] Other-    Code Status: Full Code    PT/OT Eval Status: None ordered    Assessment/Plan:    Anticipated Discharge in : To be determined    Active Hospital Problems    Diagnosis Date Noted    [facility] [J96.01] 09/07/2021     Priority: High    COVID-19 virus infection [U07.1] 09/06/2021     Priority: High    COPD (chronic obstructive pulmonary disease) ([facility] [J44.9] 09/07/2021     Priority: Medium    BPH (benign prostatic hyperplasia) [N40.0] 09/07/2021     Priority: Low    Type 2 diabetes mellitus, without long-term current use of insulin ([facility] [E11.9] 03/05/2020     Priority: Low       1. Acute respiratory failure with hypoxia-patient reports that he had oxygen saturations in the 80s at home. Patient noted to have an oxygen saturation of 89% on room air when he first came to the ED. Patient placed on 3 L of oxygen in the ED. Patient's oxygen has trended up to 6 L. We will attempt to wean patient's oxygen as able. Patient does have underlying COPD. 2. COVID-19 infection-patient noted to be positive for COVID-19 1 week ago. Patient reports that he underwent a round of steroids at home, which he states was helpful with his symptoms.   Patient noted to have difficulties with his breathing 7 days

## 2021-09-08 NOTE — PROGRESS NOTES
Respiratory therapist Thierno Stark informed this RN and the  Hospitalist that the patient has duonebs ordered but is unable to give to the patient due to him not being in a negative pressure room and having covid. Respiratory stated that medication can not be given because it is an aerosolized medication. Patient is very unhappy at this time stating \" He has been receiving this medication since 9/6 and no one has said anything to him before and that the physician has ordered it so he should be receiving it. \" Patient also requesting to take inhalers home with him when he gets discharged, respiratory therapist stated he can not do so because it belongs to the hospital. Patient does not understand why he can not take medication because he has already put his mouth on the device. Hospitalist is notified of situation and the house supervisor. I informed house supervisor that the patient is very upset, he is demanding for upper management to come speak with him. Patient stated that if this does not get squared away he is leaving AMA and will take his chances and go to another hospital if he needs to. Patient also requesting an itemized bill of all his medications he has received and when he has received them. 65-  spoke with patient regarding breathing treatments, explained to patient that once he is out of isolation tomorrow 9/9 he can then have duoneb, patient emotional during conversation but agreeable to treatment.

## 2021-09-08 NOTE — PROGRESS NOTES
Hospitalist Progress Note    Patient:  Brianda Hitchcock      Unit/Bed:8A-11/011-A    YOB: 1960    MRN: [id number]       Acct: [de-identified]     PCP: Troy Aldana MD    Date of Admission: 9/6/2021      Subjective: Patient seen and examined. Patient is a bit upset secondary to having received nebulizer treatments from admission. Patient told by respiratory therapist today that he is not supposed to receive nebulizer treatments while being Covid positive in isolation. Patient feels that his medications have not been given to him in the appropriate manner and this has caused him much frustration. Patient does feel as though his breathing has improved from the day prior. Patient states he does have oxygen at home secondary to needing oxygen 6 years ago. No fevers, no chills, no nausea, no vomiting. Medications:  Reviewed    Infusion Medications    sodium chloride Stopped (09/06/21 2200)    sodium chloride      dextrose       Scheduled Medications    albuterol sulfate HFA  2 puff Inhalation Q4H WA    budesonide-formoterol  2 puff Inhalation BID    tiotropium  2 puff Inhalation Daily    finasteride  5 mg Oral Daily    sodium chloride flush  5-40 mL IntraVENous 2 times per day    enoxaparin  30 mg SubCUTAneous BID    dexamethasone  6 mg Oral Daily    Vitamin D  2,000 Units Oral Daily    ascorbic acid  500 mg Oral Daily    zinc sulfate  50 mg Oral Daily    insulin lispro  0-6 Units SubCUTAneous TID WC    insulin lispro  0-3 Units SubCUTAneous Nightly     PRN Meds: albuterol sulfate HFA, sodium chloride flush, sodium chloride, ondansetron **OR** ondansetron, polyethylene glycol, acetaminophen **OR** acetaminophen, glucose, dextrose, glucagon (rDNA), dextrose      Intake/Output Summary (Last 24 hours) at 9/8/2021 1616  Last data filed at 9/8/2021 1108  Gross per 24 hour   Intake 840 ml   Output 0 ml   Net 840 ml       Diet:  ADULT DIET;  Regular; 5 carb choices (75 gm/meal)    Exam:  /80   Pulse 80   Temp 98.9 °F (37.2 °C) (Oral)   Resp 16   Ht 5' 10\" (1.778 m)   Wt 208 lb 8 oz (94.6 kg)   SpO2 94%   BMI 29.92 kg/m²   Physical Exam  Constitutional:       Appearance: Normal appearance. HENT:      Head: Normocephalic and atraumatic. Right Ear: External ear normal.      Left Ear: External ear normal.      Nose: Nose normal.      Mouth/Throat:      Pharynx: Oropharynx is clear. Eyes:      Extraocular Movements: Extraocular movements intact. Pupils: Pupils are equal, round, and reactive to light. Cardiovascular:      Rate and Rhythm: Normal rate and regular rhythm. Pulses: Normal pulses. Heart sounds: Normal heart sounds. No murmur heard. No friction rub. No gallop. Pulmonary:      Effort: Pulmonary effort is normal. No respiratory distress. Breath sounds: Normal breath sounds. No wheezing, rhonchi or rales. Abdominal:      General: Bowel sounds are normal. There is no distension. Tenderness: There is no abdominal tenderness. Musculoskeletal:         General: No swelling. Normal range of motion. Cervical back: Normal range of motion and neck supple. Skin:     General: Skin is warm. Neurological:      Mental Status: He is alert. Labs:   Recent Labs     09/06/21  1127 09/07/21  0638   WBC 8.3 5.6   HGB 17.2 16.8   HCT 51.7 52.6*    109*     Recent Labs     09/06/21  1127 09/07/21  0638   * 131*   K 4.2 5.3*   CL 87* 97*   CO2 26 21*   BUN 24* 26*   CREATININE 1.1 0.8   CALCIUM 10.0 9.1     Recent Labs     09/06/21  1127 09/07/21  0638   AST 74* 61*   ALT 93* 72*   BILITOT 0.9 0.4   ALKPHOS 81 73     No results for input(s): INR in the last 72 hours. No results for input(s): Natalya Holdrege in the last 72 hours.     Urinalysis:    No results found for: Elgie Reels, BACTERIA, RBCUA, BLOODU, [place]CGRAV, [address]    Radiology:  XR CHEST PORTABLE   Final Result   Mild patchy areas of linear opacities and interstitial prominence at the left midlung and bilateral lung bases as evidence for pneumonitis. **This report has been created using voice recognition software. It may contain minor errors which are inherent in voice recognition technology. **      Final report electronically signed by Dr. Katharine Marquis MD on 9/6/2021 11:49 AM          Diet: ADULT DIET; Regular; 5 carb choices (75 gm/meal)    DVT prophylaxis: [x] Lovenox                                 [] SCDs                                 [] SQ Heparin                                 [] Encourage ambulation           [] Already on Anticoagulation     Disposition:    [x] Home       [] TCU       [] Rehab       [] Psych       [] SNF       [] [facility]       [] Other-    Code Status: Full Code    PT/OT Eval Status: On board    Assessment/Plan:    Anticipated Discharge in : To be determined    Active Hospital Problems    Diagnosis Date Noted    [facility] [J96.01] 09/07/2021     Priority: High    COVID-19 virus infection [U07.1] 09/06/2021     Priority: High    COPD (chronic obstructive pulmonary disease) ([facility] [J44.9] 09/07/2021     Priority: Medium    BPH (benign prostatic hyperplasia) [N40.0] 09/07/2021     Priority: Low    Type 2 diabetes mellitus, without long-term current use of insulin ([facility] [facility] [E11.9] 03/05/2020     Priority: Low       1. Acute respiratory failure with hypoxia-patient reports that he had oxygen saturations in the 80s at home. Patient noted to have an oxygen saturation of 89% on room air when he first came to the ED. Patient placed on 3 L of oxygen in the ED. Patient's oxygen has trended up to 6 L. We will attempt to wean patient's oxygen as able. Patient does have underlying COPD. 2. COVID-19 infection-patient noted to be positive for COVID-19 1 week ago.   Patient reports that he underwent a round of steroids at home, which he states was helpful with his symptoms. Patient noted to have difficulties with his breathing 7 days after symptoms started. Patient will be treated for COVID-19 with dexamethasone. Patient will also be placed on supplements with zinc, vitamin C and vitamin D. We will follow inflammatory markers during hospitalization. Patient comes out of isolation on 9/9/2021.  3. COPD-patient with a history of COPD. Patient does not have any wheezing on exam.  Patient does not appear to be in COPD exacerbation. Due to patient's underlying COPD, patient will be continued on his inhalers. Unfortunately, since patient has COVID-19; we will not be able to give patient his nebulizer treatments for now. If patient is in a negative pressure room, we can consider starting nebulizer treatments. Patient comes out of isolation on 9/1/2021 will be able to be started on nebulizer treatments at that time. 4. BPH-patient with history of BPH. Patient will be continued on finasteride. 5. Type 2 diabetes-patient with history of type 2 diabetes. Patient placed on a insulin sliding scale. Accu-Cheks as scheduled.         Electronically signed by Ammon Patten MD on 9/8/2021 at 4:16 PM

## 2021-09-08 NOTE — CARE COORDINATION
9/8/21, 12:16 PM EDT    DISCHARGE ON GOING EVALUATION    Vlad De Jesus day: 2  Location: 8A-11/011-A Reason for admit: Hyponatremia [E87.1]  Hypoxia [R09.02]  Acute hypoxemic respiratory failure due to COVID-19 (HCC) [U07.1, J96.01]  Pneumonia due to COVID-19 virus [U07.1, J12.82]   Procedure: none  Barriers to Discharge: continues on 6L NC, sats 90-94, desats with ambulation. Decadron. Nebs. Inhaler. Vit C, D, zinc.   PCP: Colleen Szymanski MD  Readmission Risk Score: 14%  Patient Goals/Plan/Treatment Preferences: Plans home with wife, follow for home needs.

## 2021-09-08 NOTE — PROGRESS NOTES
Patients oxygen 88-90% upon morning assessment. Patient stated he had just gotten back to bed and prior to going to the bathroom oxygen was 96%. Patient does not believe that pulse oximetry is accurate however the device is showing a good pleth. Patient very irritated with management of hospital and is stating he is ready to go home.

## 2021-09-09 LAB
ANION GAP SERPL CALCULATED.3IONS-SCNC: 10 MEQ/L (ref 8–16)
BASOPHILS # BLD: 0.1 %
BASOPHILS ABSOLUTE: 0 THOU/MM3 (ref 0–0.1)
BUN BLDV-MCNC: 19 MG/DL (ref 7–22)
C-REACTIVE PROTEIN: 4.82 MG/DL (ref 0–1)
CALCIUM SERPL-MCNC: 9.2 MG/DL (ref 8.5–10.5)
CHLORIDE BLD-SCNC: 96 MEQ/L (ref 98–111)
CO2: 27 MEQ/L (ref 23–33)
CREAT SERPL-MCNC: 0.8 MG/DL (ref 0.4–1.2)
EOSINOPHIL # BLD: 0.1 %
EOSINOPHILS ABSOLUTE: 0 THOU/MM3 (ref 0–0.4)
ERYTHROCYTE [DISTWIDTH] IN BLOOD BY AUTOMATED COUNT: 13.8 % (ref 11.5–14.5)
ERYTHROCYTE [DISTWIDTH] IN BLOOD BY AUTOMATED COUNT: 52.1 FL (ref 35–45)
FERRITIN: 762 NG/ML (ref 22–322)
GFR SERPL CREATININE-BSD FRML MDRD: > 90 ML/MIN/1.73M2
GLUCOSE BLD-MCNC: 130 MG/DL (ref 70–108)
GLUCOSE BLD-MCNC: 132 MG/DL (ref 70–108)
GLUCOSE BLD-MCNC: 282 MG/DL (ref 70–108)
GLUCOSE BLD-MCNC: 298 MG/DL (ref 70–108)
GLUCOSE BLD-MCNC: 328 MG/DL (ref 70–108)
HCT VFR BLD CALC: 50 % (ref 42–52)
HEMOGLOBIN: 16.8 GM/DL (ref 14–18)
IMMATURE GRANS (ABS): 0.07 THOU/MM3 (ref 0–0.07)
IMMATURE GRANULOCYTES: 0.8 %
LD: 319 U/L (ref 100–190)
LYMPHOCYTES # BLD: 7.3 %
LYMPHOCYTES ABSOLUTE: 0.7 THOU/MM3 (ref 1–4.8)
MCH RBC QN AUTO: 34.1 PG (ref 26–33)
MCHC RBC AUTO-ENTMCNC: 33.6 GM/DL (ref 32.2–35.5)
MCV RBC AUTO: 101.6 FL (ref 80–94)
MONOCYTES # BLD: 6.3 %
MONOCYTES ABSOLUTE: 0.6 THOU/MM3 (ref 0.4–1.3)
NUCLEATED RED BLOOD CELLS: 0 /100 WBC
PLATELET # BLD: 167 THOU/MM3 (ref 130–400)
PMV BLD AUTO: 9.6 FL (ref 9.4–12.4)
POTASSIUM SERPL-SCNC: 4.8 MEQ/L (ref 3.5–5.2)
RBC # BLD: 4.92 MILL/MM3 (ref 4.7–6.1)
SEG NEUTROPHILS: 85.4 %
SEGMENTED NEUTROPHILS ABSOLUTE COUNT: 7.9 THOU/MM3 (ref 1.8–7.7)
SODIUM BLD-SCNC: 133 MEQ/L (ref 135–145)
WBC # BLD: 9.3 THOU/MM3 (ref 4.8–10.8)

## 2021-09-09 PROCEDURE — 94760 N-INVAS EAR/PLS OXIMETRY 1: CPT

## 2021-09-09 PROCEDURE — 6370000000 HC RX 637 (ALT 250 FOR IP): Performed by: PHYSICIAN ASSISTANT

## 2021-09-09 PROCEDURE — 6360000002 HC RX W HCPCS: Performed by: PHYSICIAN ASSISTANT

## 2021-09-09 PROCEDURE — 82948 REAGENT STRIP/BLOOD GLUCOSE: CPT

## 2021-09-09 PROCEDURE — 99232 SBSQ HOSP IP/OBS MODERATE 35: CPT | Performed by: HOSPITALIST

## 2021-09-09 PROCEDURE — 2580000003 HC RX 258: Performed by: PHYSICIAN ASSISTANT

## 2021-09-09 PROCEDURE — 94640 AIRWAY INHALATION TREATMENT: CPT

## 2021-09-09 PROCEDURE — 1200000003 HC TELEMETRY R&B

## 2021-09-09 PROCEDURE — 6370000000 HC RX 637 (ALT 250 FOR IP): Performed by: HOSPITALIST

## 2021-09-09 PROCEDURE — 83615 LACTATE (LD) (LDH) ENZYME: CPT

## 2021-09-09 PROCEDURE — 86140 C-REACTIVE PROTEIN: CPT

## 2021-09-09 PROCEDURE — 85025 COMPLETE CBC W/AUTO DIFF WBC: CPT

## 2021-09-09 PROCEDURE — 2700000000 HC OXYGEN THERAPY PER DAY

## 2021-09-09 PROCEDURE — 36415 COLL VENOUS BLD VENIPUNCTURE: CPT

## 2021-09-09 PROCEDURE — 82728 ASSAY OF FERRITIN: CPT

## 2021-09-09 PROCEDURE — 80048 BASIC METABOLIC PNL TOTAL CA: CPT

## 2021-09-09 RX ORDER — IPRATROPIUM BROMIDE AND ALBUTEROL SULFATE 2.5; .5 MG/3ML; MG/3ML
1 SOLUTION RESPIRATORY (INHALATION)
Status: DISCONTINUED | OUTPATIENT
Start: 2021-09-09 | End: 2021-09-11 | Stop reason: HOSPADM

## 2021-09-09 RX ORDER — IPRATROPIUM BROMIDE AND ALBUTEROL SULFATE 2.5; .5 MG/3ML; MG/3ML
1 SOLUTION RESPIRATORY (INHALATION) EVERY 4 HOURS PRN
Status: DISCONTINUED | OUTPATIENT
Start: 2021-09-09 | End: 2021-09-11 | Stop reason: HOSPADM

## 2021-09-09 RX ADMIN — IPRATROPIUM BROMIDE AND ALBUTEROL SULFATE 1 AMPULE: .5; 3 SOLUTION RESPIRATORY (INHALATION) at 15:40

## 2021-09-09 RX ADMIN — SODIUM CHLORIDE, PRESERVATIVE FREE 10 ML: 5 INJECTION INTRAVENOUS at 09:08

## 2021-09-09 RX ADMIN — Medication 2000 UNITS: at 10:54

## 2021-09-09 RX ADMIN — BUDESONIDE AND FORMOTEROL FUMARATE DIHYDRATE 2 PUFF: 80; 4.5 AEROSOL RESPIRATORY (INHALATION) at 18:27

## 2021-09-09 RX ADMIN — SODIUM CHLORIDE, PRESERVATIVE FREE 10 ML: 5 INJECTION INTRAVENOUS at 21:18

## 2021-09-09 RX ADMIN — DEXAMETHASONE 6 MG: 4 TABLET ORAL at 10:53

## 2021-09-09 RX ADMIN — INSULIN LISPRO 2 UNITS: 100 INJECTION, SOLUTION INTRAVENOUS; SUBCUTANEOUS at 21:13

## 2021-09-09 RX ADMIN — ALBUTEROL SULFATE 2 PUFF: 90 AEROSOL, METERED RESPIRATORY (INHALATION) at 08:32

## 2021-09-09 RX ADMIN — OXYCODONE HYDROCHLORIDE AND ACETAMINOPHEN 500 MG: 500 TABLET ORAL at 09:08

## 2021-09-09 RX ADMIN — BUDESONIDE AND FORMOTEROL FUMARATE DIHYDRATE 2 PUFF: 80; 4.5 AEROSOL RESPIRATORY (INHALATION) at 08:32

## 2021-09-09 RX ADMIN — INSULIN LISPRO 3 UNITS: 100 INJECTION, SOLUTION INTRAVENOUS; SUBCUTANEOUS at 17:55

## 2021-09-09 RX ADMIN — FINASTERIDE 5 MG: 5 TABLET, FILM COATED ORAL at 10:54

## 2021-09-09 RX ADMIN — ENOXAPARIN SODIUM 30 MG: 30 INJECTION SUBCUTANEOUS at 09:08

## 2021-09-09 RX ADMIN — Medication 50 MG: at 13:29

## 2021-09-09 RX ADMIN — ALBUTEROL SULFATE 2 PUFF: 90 AEROSOL, METERED RESPIRATORY (INHALATION) at 15:40

## 2021-09-09 RX ADMIN — TIOTROPIUM BROMIDE INHALATION SPRAY 2 PUFF: 3.12 SPRAY, METERED RESPIRATORY (INHALATION) at 08:32

## 2021-09-09 RX ADMIN — IPRATROPIUM BROMIDE AND ALBUTEROL SULFATE 1 AMPULE: .5; 3 SOLUTION RESPIRATORY (INHALATION) at 20:40

## 2021-09-09 RX ADMIN — IPRATROPIUM BROMIDE AND ALBUTEROL SULFATE 1 AMPULE: .5; 3 SOLUTION RESPIRATORY (INHALATION) at 11:00

## 2021-09-09 RX ADMIN — IPRATROPIUM BROMIDE AND ALBUTEROL SULFATE 1 AMPULE: .5; 3 SOLUTION RESPIRATORY (INHALATION) at 03:23

## 2021-09-09 RX ADMIN — ENOXAPARIN SODIUM 30 MG: 30 INJECTION SUBCUTANEOUS at 21:13

## 2021-09-09 RX ADMIN — INSULIN LISPRO 3 UNITS: 100 INJECTION, SOLUTION INTRAVENOUS; SUBCUTANEOUS at 13:25

## 2021-09-09 ASSESSMENT — PAIN SCALES - GENERAL
PAINLEVEL_OUTOF10: 0

## 2021-09-09 NOTE — PROGRESS NOTES
Patient removed from [facility] per [facility] guidelines as approved by Dr. Mine Olmos.

## 2021-09-09 NOTE — PROGRESS NOTES
Patient very verbal about how he is upset that his duoneb were discontinued. States he feels like \"this hospital does not care about his well being but the well being of others over his. \"  States he wish he \"never came to this hospital and that he would never be coming here again. \" This RN attempted to calm patient; however, pt became more verbally aggressive. Patient wanting duonebs restarted. This RN reached out to [facility] [place] who stated we could discontinue the albuterol inhaler and reorder the duonebs. Patient out of isolation today 9/9. Respiratory called for a treatment whom stated they would be here as soon as they could. Patient notified of changes. Patient now in pleasant and grateful mood.

## 2021-09-09 NOTE — CARE COORDINATION
9/9/21, 1:33 PM EDT    DISCHARGE ON GOING EVALUATION    Yvon Ibrahim day: 3  Location: 8A-11/011-A Reason for admit: Hyponatremia [E87.1]  Hypoxia [R09.02]  Acute hypoxemic respiratory failure due to COVID-19 ([address] [U07.1, J96.01]  Pneumonia due to COVID-19 virus [U07.1, J12.82]   Procedure: None  Barriers to Discharge: Hospitalist following. Pt had been removed from [facility] today. Planning transfer to [facility]. Pt remains on O2 at 5L/nc. CRP 4.82, . Ferritin 762. Vit C, Symbicort, Decadron, Albuterol, Lovenox, Proscar, Humalog, DuoNebs q4hr WA, Vit D, Zinc.   PCP: Bienvenido Madrigal MD  Readmission Risk Score: 13%  Patient Goals/Plan/Treatment Preferences: Planning home with wife at discharge. Monitor for possible needs, including need for home O2?

## 2021-09-10 LAB
GLUCOSE BLD-MCNC: 197 MG/DL (ref 70–108)
GLUCOSE BLD-MCNC: 276 MG/DL (ref 70–108)
GLUCOSE BLD-MCNC: 291 MG/DL (ref 70–108)
GLUCOSE BLD-MCNC: 305 MG/DL (ref 70–108)

## 2021-09-10 PROCEDURE — 6370000000 HC RX 637 (ALT 250 FOR IP): Performed by: PHYSICIAN ASSISTANT

## 2021-09-10 PROCEDURE — 2700000000 HC OXYGEN THERAPY PER DAY

## 2021-09-10 PROCEDURE — 82948 REAGENT STRIP/BLOOD GLUCOSE: CPT

## 2021-09-10 PROCEDURE — 1200000003 HC TELEMETRY R&B

## 2021-09-10 PROCEDURE — 94760 N-INVAS EAR/PLS OXIMETRY 1: CPT

## 2021-09-10 PROCEDURE — 99232 SBSQ HOSP IP/OBS MODERATE 35: CPT | Performed by: HOSPITALIST

## 2021-09-10 PROCEDURE — 6370000000 HC RX 637 (ALT 250 FOR IP): Performed by: HOSPITALIST

## 2021-09-10 PROCEDURE — 6360000002 HC RX W HCPCS: Performed by: PHYSICIAN ASSISTANT

## 2021-09-10 PROCEDURE — 94640 AIRWAY INHALATION TREATMENT: CPT

## 2021-09-10 PROCEDURE — 2580000003 HC RX 258: Performed by: PHYSICIAN ASSISTANT

## 2021-09-10 RX ADMIN — ENOXAPARIN SODIUM 30 MG: 30 INJECTION SUBCUTANEOUS at 10:04

## 2021-09-10 RX ADMIN — Medication 50 MG: at 10:05

## 2021-09-10 RX ADMIN — OXYCODONE HYDROCHLORIDE AND ACETAMINOPHEN 500 MG: 500 TABLET ORAL at 10:06

## 2021-09-10 RX ADMIN — Medication 2000 UNITS: at 10:05

## 2021-09-10 RX ADMIN — IPRATROPIUM BROMIDE AND ALBUTEROL SULFATE 1 AMPULE: .5; 3 SOLUTION RESPIRATORY (INHALATION) at 10:13

## 2021-09-10 RX ADMIN — SODIUM CHLORIDE, PRESERVATIVE FREE 10 ML: 5 INJECTION INTRAVENOUS at 21:55

## 2021-09-10 RX ADMIN — ALBUTEROL SULFATE 2 PUFF: 90 AEROSOL, METERED RESPIRATORY (INHALATION) at 12:47

## 2021-09-10 RX ADMIN — INSULIN LISPRO 3 UNITS: 100 INJECTION, SOLUTION INTRAVENOUS; SUBCUTANEOUS at 19:45

## 2021-09-10 RX ADMIN — FINASTERIDE 5 MG: 5 TABLET, FILM COATED ORAL at 10:05

## 2021-09-10 RX ADMIN — DEXAMETHASONE 6 MG: 4 TABLET ORAL at 10:06

## 2021-09-10 RX ADMIN — SODIUM CHLORIDE, PRESERVATIVE FREE 10 ML: 5 INJECTION INTRAVENOUS at 10:05

## 2021-09-10 RX ADMIN — BUDESONIDE AND FORMOTEROL FUMARATE DIHYDRATE 2 PUFF: 80; 4.5 AEROSOL RESPIRATORY (INHALATION) at 06:15

## 2021-09-10 RX ADMIN — IPRATROPIUM BROMIDE AND ALBUTEROL SULFATE 1 AMPULE: .5; 3 SOLUTION RESPIRATORY (INHALATION) at 06:15

## 2021-09-10 RX ADMIN — BUDESONIDE AND FORMOTEROL FUMARATE DIHYDRATE 2 PUFF: 80; 4.5 AEROSOL RESPIRATORY (INHALATION) at 16:43

## 2021-09-10 RX ADMIN — IPRATROPIUM BROMIDE AND ALBUTEROL SULFATE 1 AMPULE: .5; 3 SOLUTION RESPIRATORY (INHALATION) at 20:20

## 2021-09-10 RX ADMIN — INSULIN LISPRO 1 UNITS: 100 INJECTION, SOLUTION INTRAVENOUS; SUBCUTANEOUS at 10:04

## 2021-09-10 RX ADMIN — IPRATROPIUM BROMIDE AND ALBUTEROL SULFATE 1 AMPULE: .5; 3 SOLUTION RESPIRATORY (INHALATION) at 16:42

## 2021-09-10 RX ADMIN — IPRATROPIUM BROMIDE AND ALBUTEROL SULFATE 1 AMPULE: .5; 3 SOLUTION RESPIRATORY (INHALATION) at 03:38

## 2021-09-10 RX ADMIN — INSULIN LISPRO 3 UNITS: 100 INJECTION, SOLUTION INTRAVENOUS; SUBCUTANEOUS at 13:21

## 2021-09-10 RX ADMIN — INSULIN LISPRO 2 UNITS: 100 INJECTION, SOLUTION INTRAVENOUS; SUBCUTANEOUS at 23:20

## 2021-09-10 RX ADMIN — ENOXAPARIN SODIUM 30 MG: 30 INJECTION SUBCUTANEOUS at 21:55

## 2021-09-10 ASSESSMENT — PAIN SCALES - GENERAL
PAINLEVEL_OUTOF10: 0
PAINLEVEL_OUTOF10: 0

## 2021-09-10 NOTE — PROGRESS NOTES
Hospitalist Progress Note    Patient:  Landy Aragon      Unit/Bed:8B-28/028-A    YOB: 1960    MRN: [id number]       Acct: [de-identified]     PCP: Harrie Goodpasture, MD    Date of Admission: 9/6/2021      Subjective: Patient seen and examined. Patient states that he is feeling better today than he has in previous days. Patient states he was having a difficult time with his breathing last night and needed a breathing treatment. Patient states he was started on a breathing treatment around 3 AM.  Patient states that breathing treatment really helped open up his airways. Patient is hoping he will be able to go home soon. Patient is looking forward to being able to be moved out of the Covid unit. Medications:  Reviewed    Infusion Medications    sodium chloride Stopped (09/06/21 2200)    sodium chloride      dextrose       Scheduled Medications    ipratropium-albuterol  1 ampule Inhalation Q4H WA    albuterol sulfate HFA  2 puff Inhalation Q4H WA    budesonide-formoterol  2 puff Inhalation BID    [Held by provider] tiotropium  2 puff Inhalation Daily    finasteride  5 mg Oral Daily    sodium chloride flush  5-40 mL IntraVENous 2 times per day    enoxaparin  30 mg SubCUTAneous BID    dexamethasone  6 mg Oral Daily    Vitamin D  2,000 Units Oral Daily    ascorbic acid  500 mg Oral Daily    zinc sulfate  50 mg Oral Daily    insulin lispro  0-6 Units SubCUTAneous TID WC    insulin lispro  0-3 Units SubCUTAneous Nightly     PRN Meds: ipratropium-albuterol, sodium chloride flush, sodium chloride, ondansetron **OR** ondansetron, polyethylene glycol, acetaminophen **OR** acetaminophen, glucose, dextrose, glucagon (rDNA), dextrose      Intake/Output Summary (Last 24 hours) at 9/9/2021 2514  Last data filed at 9/9/2021 0900  Gross per 24 hour   Intake 620 ml   Output --   Net 620 ml       Diet:  ADULT DIET;  Regular; 5 carb choices (75 gm/meal)    Exam:  /67   Pulse 64   Temp 97.7 °F (36.5 °C) (Oral)   Resp 18   Ht 5' 10\" (1.778 m)   Wt 208 lb 8 oz (94.6 kg)   SpO2 93%   BMI 29.92 kg/m²   Physical Exam  Constitutional:       Appearance: Normal appearance. HENT:      Head: Normocephalic and atraumatic. Right Ear: External ear normal.      Left Ear: External ear normal.      Nose: Nose normal.      Mouth/Throat:      Pharynx: Oropharynx is clear. Eyes:      Extraocular Movements: Extraocular movements intact. Pupils: Pupils are equal, round, and reactive to light. Cardiovascular:      Rate and Rhythm: Normal rate and regular rhythm. Pulses: Normal pulses. Heart sounds: Normal heart sounds. No murmur heard. No friction rub. No gallop. Pulmonary:      Effort: Pulmonary effort is normal. No respiratory distress. Breath sounds: Normal breath sounds. No wheezing, rhonchi or rales. Abdominal:      General: Bowel sounds are normal. There is no distension. Tenderness: There is no abdominal tenderness. Musculoskeletal:         General: No swelling. Normal range of motion. Cervical back: Normal range of motion and neck supple. Skin:     General: Skin is warm. Neurological:      Mental Status: He is alert. Labs:   Recent Labs     09/07/21  0638 09/09/21  0658   WBC 5.6 9.3   HGB 16.8 16.8   HCT 52.6* 50.0   * 167     Recent Labs     09/07/21  0638 09/09/21  0658   * 133*   K 5.3* 4.8   CL 97* 96*   CO2 21* 27   BUN 26* 19   CREATININE 0.8 0.8   CALCIUM 9.1 9.2     Recent Labs     09/07/21  0638   AST 61*   ALT 72*   BILITOT 0.4   ALKPHOS 73     No results for input(s): INR in the last 72 hours. No results for input(s): Shoshana Basques in the last 72 hours.     Urinalysis:    No results found for: Elvi Fast, BACTERIA, RBCUA, BLOODU, [address], [address]    Radiology:  XR CHEST PORTABLE   Final Result   Mild patchy areas of linear opacities and interstitial prominence at the left midlung and bilateral lung bases as evidence for pneumonitis. **This report has been created using voice recognition software. It may contain minor errors which are inherent in voice recognition technology. **      Final report electronically signed by Dr. Alexandra Valentin MD on 9/6/2021 11:49 AM          Diet: ADULT DIET; Regular; 5 carb choices (75 gm/meal)    DVT prophylaxis: [x] Lovenox                                 [] SCDs                                 [] SQ Heparin                                 [] Encourage ambulation           [] Already on Anticoagulation     Disposition:    [x] Home       [] TCU       [] Rehab       [] Psych       [] SNF       [] [place]       [] Other-    Code Status: Full Code    PT/OT Eval Status: None ordered    Assessment/Plan:    Anticipated Discharge in : 1 to 2 days    Active Hospital Problems    Diagnosis Date Noted    Acute respiratory failure with hypoxia ([facility] [J96.01] 09/07/2021     Priority: High    COVID-19 virus infection [U07.1] 09/06/2021     Priority: High    COPD (chronic obstructive pulmonary disease) ([facility] [J44.9] 09/07/2021     Priority: Medium    BPH (benign prostatic hyperplasia) [N40.0] 09/07/2021     Priority: Low    Type 2 diabetes mellitus, without long-term current use of insulin ([facility] [E11.9] 03/05/2020     Priority: Low       1. Acute respiratory failure with hypoxia-patient reports that he had oxygen saturations in the 80s at home. Patient noted to have an oxygen saturation of 89% on room air when he first came to the ED. Patient placed on 3 L of oxygen in the ED. Patient's oxygen has trended up to 6 L. We will attempt to wean patient's oxygen as able. Patient does have underlying COPD. 2. COVID-19 infection-patient noted to be positive for COVID-19 1 week ago. Patient reports that he underwent a round of steroids at home, which he states was helpful with his symptoms.   Patient noted to have difficulties with his breathing 7 days after symptoms started. Patient will be treated for COVID-19 with dexamethasone. Patient will also be placed on supplements with zinc, vitamin C and vitamin D. We will follow inflammatory markers during hospitalization. Patient came out of isolation on 9/9/2021.  3. COPD-patient with a history of COPD. Patient does not have any wheezing on exam.  Patient does not appear to be in COPD exacerbation. Due to patient's underlying COPD, patient will be continued on his inhalers. Unfortunately, since patient has COVID-19; we will not be able to give patient his nebulizer treatments for now. If patient is in a negative pressure room, we can consider starting nebulizer treatments. Patient comes out of isolation on 9/9/2021 will be able to be started on nebulizer treatments at that time. Patient resumed on nebulizer treatments on 9/9/2021.  4. BPH-patient with history of BPH. Patient will be continued on finasteride. 5. Type 2 diabetes-patient with history of type 2 diabetes. Patient placed on a insulin sliding scale. Accu-Cheks as scheduled.         Electronically signed by Nikolas Boykin MD on 9/9/2021 at 10:34 PM

## 2021-09-10 NOTE — CARE COORDINATION
Discharge Planning Update:     Treating Covid. Now out of isolation. Continues to require O2 at 4L/NC. T 99.2 this am. Nebulizer tx q 4 hrs. PO Decadron. Zinc. CRP yesterday 4.82. Plans return home with spouse. Follow for possible home O2 needs.

## 2021-09-10 NOTE — PLAN OF CARE
Problem: Airway Clearance - Ineffective  Goal: Achieve or maintain patent airway  Outcome: Met This Shift     Problem: Gas Exchange - Impaired  Goal: Absence of hypoxia  Outcome: Ongoing  Goal: Promote optimal lung function  Outcome: Ongoing     Problem: Breathing Pattern - Ineffective  Goal: Ability to achieve and maintain a regular respiratory rate  Outcome: Met This Shift

## 2021-09-11 VITALS
DIASTOLIC BLOOD PRESSURE: 60 MMHG | TEMPERATURE: 98.3 F | WEIGHT: 208.5 LBS | HEIGHT: 70 IN | OXYGEN SATURATION: 90 % | HEART RATE: 62 BPM | BODY MASS INDEX: 29.85 KG/M2 | RESPIRATION RATE: 18 BRPM | SYSTOLIC BLOOD PRESSURE: 128 MMHG

## 2021-09-11 LAB
ANION GAP SERPL CALCULATED.3IONS-SCNC: 7 MEQ/L (ref 8–16)
BASOPHILS # BLD: 0.2 %
BASOPHILS ABSOLUTE: 0 THOU/MM3 (ref 0–0.1)
BUN BLDV-MCNC: 16 MG/DL (ref 7–22)
C-REACTIVE PROTEIN: 10.87 MG/DL (ref 0–1)
CALCIUM SERPL-MCNC: 9.5 MG/DL (ref 8.5–10.5)
CHLORIDE BLD-SCNC: 97 MEQ/L (ref 98–111)
CO2: 32 MEQ/L (ref 23–33)
CREAT SERPL-MCNC: 0.8 MG/DL (ref 0.4–1.2)
EOSINOPHIL # BLD: 0.6 %
EOSINOPHILS ABSOLUTE: 0 THOU/MM3 (ref 0–0.4)
ERYTHROCYTE [DISTWIDTH] IN BLOOD BY AUTOMATED COUNT: 13.5 % (ref 11.5–14.5)
ERYTHROCYTE [DISTWIDTH] IN BLOOD BY AUTOMATED COUNT: 51.4 FL (ref 35–45)
FERRITIN: 699 NG/ML (ref 22–322)
GFR SERPL CREATININE-BSD FRML MDRD: > 90 ML/MIN/1.73M2
GLUCOSE BLD-MCNC: 136 MG/DL (ref 70–108)
GLUCOSE BLD-MCNC: 185 MG/DL (ref 70–108)
GLUCOSE BLD-MCNC: 348 MG/DL (ref 70–108)
GLUCOSE BLD-MCNC: 354 MG/DL (ref 70–108)
HCT VFR BLD CALC: 46.1 % (ref 42–52)
HEMOGLOBIN: 15.2 GM/DL (ref 14–18)
IMMATURE GRANS (ABS): 0.05 THOU/MM3 (ref 0–0.07)
IMMATURE GRANULOCYTES: 0.8 %
LD: 274 U/L (ref 100–190)
LYMPHOCYTES # BLD: 11.2 %
LYMPHOCYTES ABSOLUTE: 0.7 THOU/MM3 (ref 1–4.8)
MCH RBC QN AUTO: 33.6 PG (ref 26–33)
MCHC RBC AUTO-ENTMCNC: 33 GM/DL (ref 32.2–35.5)
MCV RBC AUTO: 101.8 FL (ref 80–94)
MONOCYTES # BLD: 4.4 %
MONOCYTES ABSOLUTE: 0.3 THOU/MM3 (ref 0.4–1.3)
NUCLEATED RED BLOOD CELLS: 0 /100 WBC
PLATELET # BLD: 188 THOU/MM3 (ref 130–400)
PMV BLD AUTO: 9.7 FL (ref 9.4–12.4)
POTASSIUM SERPL-SCNC: 5.4 MEQ/L (ref 3.5–5.2)
POTASSIUM SERPL-SCNC: 5.5 MEQ/L (ref 3.5–5.2)
RBC # BLD: 4.53 MILL/MM3 (ref 4.7–6.1)
SEG NEUTROPHILS: 82.8 %
SEGMENTED NEUTROPHILS ABSOLUTE COUNT: 5.4 THOU/MM3 (ref 1.8–7.7)
SODIUM BLD-SCNC: 136 MEQ/L (ref 135–145)
WBC # BLD: 6.5 THOU/MM3 (ref 4.8–10.8)

## 2021-09-11 PROCEDURE — 99239 HOSP IP/OBS DSCHRG MGMT >30: CPT | Performed by: HOSPITALIST

## 2021-09-11 PROCEDURE — 94640 AIRWAY INHALATION TREATMENT: CPT

## 2021-09-11 PROCEDURE — 94760 N-INVAS EAR/PLS OXIMETRY 1: CPT

## 2021-09-11 PROCEDURE — 86140 C-REACTIVE PROTEIN: CPT

## 2021-09-11 PROCEDURE — 2700000000 HC OXYGEN THERAPY PER DAY

## 2021-09-11 PROCEDURE — 6360000002 HC RX W HCPCS: Performed by: PHYSICIAN ASSISTANT

## 2021-09-11 PROCEDURE — 82948 REAGENT STRIP/BLOOD GLUCOSE: CPT

## 2021-09-11 PROCEDURE — 83615 LACTATE (LD) (LDH) ENZYME: CPT

## 2021-09-11 PROCEDURE — 6370000000 HC RX 637 (ALT 250 FOR IP): Performed by: PHYSICIAN ASSISTANT

## 2021-09-11 PROCEDURE — 82728 ASSAY OF FERRITIN: CPT

## 2021-09-11 PROCEDURE — 85025 COMPLETE CBC W/AUTO DIFF WBC: CPT

## 2021-09-11 PROCEDURE — 84132 ASSAY OF SERUM POTASSIUM: CPT

## 2021-09-11 PROCEDURE — 2580000003 HC RX 258: Performed by: PHYSICIAN ASSISTANT

## 2021-09-11 PROCEDURE — 6370000000 HC RX 637 (ALT 250 FOR IP): Performed by: HOSPITALIST

## 2021-09-11 PROCEDURE — 36415 COLL VENOUS BLD VENIPUNCTURE: CPT

## 2021-09-11 PROCEDURE — 80048 BASIC METABOLIC PNL TOTAL CA: CPT

## 2021-09-11 RX ORDER — DEXAMETHASONE 6 MG/1
6 TABLET ORAL DAILY
Qty: 4 TABLET | Refills: 0 | Status: SHIPPED | OUTPATIENT
Start: 2021-09-12 | End: 2021-09-16

## 2021-09-11 RX ORDER — IPRATROPIUM BROMIDE AND ALBUTEROL SULFATE 2.5; .5 MG/3ML; MG/3ML
3 SOLUTION RESPIRATORY (INHALATION) EVERY 4 HOURS PRN
Qty: 360 ML | Refills: 3 | Status: SHIPPED | OUTPATIENT
Start: 2021-09-11

## 2021-09-11 RX ORDER — CHOLECALCIFEROL (VITAMIN D3) 50 MCG
2000 TABLET ORAL DAILY
Qty: 60 TABLET | Refills: 1 | Status: SHIPPED | OUTPATIENT
Start: 2021-09-12

## 2021-09-11 RX ORDER — ASCORBIC ACID 500 MG
500 TABLET ORAL DAILY
Qty: 30 TABLET | Refills: 3 | Status: SHIPPED | OUTPATIENT
Start: 2021-09-12

## 2021-09-11 RX ADMIN — SODIUM CHLORIDE, PRESERVATIVE FREE 10 ML: 5 INJECTION INTRAVENOUS at 09:06

## 2021-09-11 RX ADMIN — BUDESONIDE AND FORMOTEROL FUMARATE DIHYDRATE 2 PUFF: 80; 4.5 AEROSOL RESPIRATORY (INHALATION) at 08:43

## 2021-09-11 RX ADMIN — INSULIN LISPRO 4 UNITS: 100 INJECTION, SOLUTION INTRAVENOUS; SUBCUTANEOUS at 17:05

## 2021-09-11 RX ADMIN — ALBUTEROL SULFATE 2 PUFF: 90 AEROSOL, METERED RESPIRATORY (INHALATION) at 08:43

## 2021-09-11 RX ADMIN — Medication 2000 UNITS: at 09:05

## 2021-09-11 RX ADMIN — DEXAMETHASONE 6 MG: 4 TABLET ORAL at 09:05

## 2021-09-11 RX ADMIN — IPRATROPIUM BROMIDE AND ALBUTEROL SULFATE 1 AMPULE: .5; 3 SOLUTION RESPIRATORY (INHALATION) at 08:43

## 2021-09-11 RX ADMIN — IPRATROPIUM BROMIDE AND ALBUTEROL SULFATE 1 AMPULE: .5; 3 SOLUTION RESPIRATORY (INHALATION) at 13:14

## 2021-09-11 RX ADMIN — OXYCODONE HYDROCHLORIDE AND ACETAMINOPHEN 500 MG: 500 TABLET ORAL at 09:05

## 2021-09-11 RX ADMIN — Medication 50 MG: at 09:05

## 2021-09-11 RX ADMIN — ENOXAPARIN SODIUM 30 MG: 30 INJECTION SUBCUTANEOUS at 09:06

## 2021-09-11 RX ADMIN — IPRATROPIUM BROMIDE AND ALBUTEROL SULFATE 1 AMPULE: .5; 3 SOLUTION RESPIRATORY (INHALATION) at 17:10

## 2021-09-11 RX ADMIN — INSULIN LISPRO 5 UNITS: 100 INJECTION, SOLUTION INTRAVENOUS; SUBCUTANEOUS at 13:02

## 2021-09-11 RX ADMIN — FINASTERIDE 5 MG: 5 TABLET, FILM COATED ORAL at 09:05

## 2021-09-11 ASSESSMENT — PAIN SCALES - GENERAL
PAINLEVEL_OUTOF10: 0

## 2021-09-11 NOTE — FLOWSHEET NOTE
09/11/21 1850   Provider Notification   Reason for Communication Review case  (okay for discharge)   Provider Name [facility]   Provider Notification Physician   Method of Communication Secure Message   Response Waiting for response   Notification Time [phone]   would you be okay if patient was discharged home with the concentrator he had previously through [facility] they are either going to bring him the portable tonight or tomorrow.  patient is ready to go  Dr. Bassam Garrett   -

## 2021-09-11 NOTE — DISCHARGE SUMMARY
Discharge Summary    Patient:  Brinada Hitchcock  YOB: 1960    MRN: [id number]   Acct: [de-identified]    Primary Care Physician: Troy Aldana MD    Admit date:  9/6/2021    Discharge date:   9/11/2021      Discharge Diagnoses:   <principal problem not specified>  Active Problems:    COVID-19 virus infection    Acute respiratory failure with hypoxia (HCC)    COPD (chronic obstructive pulmonary disease) ([address]    Type 2 diabetes mellitus, without long-term current use of insulin (HCC)    BPH (benign prostatic hyperplasia)  Resolved Problems:    * No resolved hospital problems. *        Admitted for: (HPI)  Brianda Hitchcock is a 57 y.o. male with PMHx of COPD, type 2 DM, asthma and obesity  who presents to [facility] with worsening shortness of breath following a recent diagnosis of COVID19 on 8/28/21. He was given a week long course of oral steroids and was feeling better until he completed them. On 9/3/21 he began to have worsening shortness of breath. He started using 3L of oxygen at home to maintain a SP02 of around 95. He has some residual productive cough since his initial diagnosis but denies chest pain, palpitations, numbness and tingling, dizziness, abdominal pain. Hospital Course:  1. Acute respiratory failure with hypoxia-patient reports that he had oxygen saturations in the 80s at home. Patient noted to have an oxygen saturation of 89% on room air when he first came to the ED. Patient placed on 3 L of oxygen in the ED. Patient's oxygen has trended up to 6 L. We will attempt to wean patient's oxygen as able. Patient does have underlying COPD. Patient required oxygen on discharge. Patient required 2 L of oxygen at rest.  2. COVID-19 infection-patient noted to be positive for COVID-19 1 week ago. Patient reports that he underwent a round of steroids at home, which he states was helpful with his symptoms.   Patient noted to have difficulties with his breathing 7 days after symptoms started. Patient will be treated for COVID-19 with dexamethasone. Patient will also be placed on supplements with zinc, vitamin C and vitamin D. We will follow inflammatory markers during hospitalization. Patient came out of isolation on 9/9/2021. Patient encouraged to continue using Acapella and incentive spirometer during hospitalization. Patient will continue acapella and incentive spirometer at home. 3. COPD-patient with a history of COPD. Patient does not have any wheezing on exam.  Patient does not appear to be in COPD exacerbation. Due to patient's underlying COPD, patient will be continued on his inhalers. Unfortunately, since patient has COVID-19; we will not be able to give patient his nebulizer treatments for now. If patient is in a negative pressure room, we can consider starting nebulizer treatments. Patient comes out of isolation on 9/9/2021 will be able to be started on nebulizer treatments at that time. Patient resumed on nebulizer treatments on 9/9/2021. Patient reports improvement in his breathing after nebulizer treatment. Patient discharged with script for duoneb. 4. BPH-patient with history of BPH. Patient will be continued on finasteride. 5. Type 2 diabetes-patient with history of type 2 diabetes. Patient placed on a insulin sliding scale. Accu-Cheks as scheduled. Patient was evaluated today for the diagnosis of COPD, COVID 19 pneumonia . I entered a DME order for home oxygen because the diagnosis and testing requires the patient to have supplemental oxygen. Condition will improve or be benefited by oxygen use. The patient is  able to perform good mobility in a home setting and therefore does require the use of a portable oxygen system. The need for this equipment was discussed with the patient and he understands and is in agreement.     Consultants:  None    Discharge Medications:       Medication List      START taking these medications    ascorbic acid 500 MG tablet  Commonly known as: VITAMIN C  Take 1 tablet by mouth daily  Start taking on: September 12, 2021     dexamethasone 6 MG tablet  Commonly known as: DECADRON  Take 1 tablet by mouth daily for 4 doses  Start taking on: September 12, 2021     vitamin D 50 MCG (2000 UT) Tabs tablet  Commonly known as: CHOLECALCIFEROL  Take 1 tablet by mouth daily  Start taking on: September 12, 2021        Artist Arleen taking these medications    aclidinium 400 MCG/ACT Aepb inhaler  Commonly known as: TUDORZA PRESSAIR     budesonide-formoterol 80-4.5 MCG/ACT Aero  Commonly known as: SYMBICORT     finasteride 5 MG tablet  Commonly known as: PROSCAR     glimepiride 1 MG tablet  Commonly known as: AMARYL     Glyxambi 10-5 MG Tabs  Generic drug: Empagliflozin-linaGLIPtin     ipratropium-albuterol 0.5-2.5 (3) MG/3ML Soln nebulizer solution  Commonly known as: DUONEB  Inhale 3 mLs into the lungs every 4 hours as needed for Shortness of Breath     metFORMIN 1000 MG tablet  Commonly known as: GLUCOPHAGE     * PROAIR HFA IN     * albuterol (2.5 MG/3ML) 0.083% nebulizer solution  Commonly known as: PROVENTIL         * This list has 2 medication(s) that are the same as other medications prescribed for you. Read the directions carefully, and ask your doctor or other care provider to review them with you.                Where to Get Your Medications      These medications were sent to [address]  [address] [place] [id number]    Phone: [phone]   · ascorbic acid 500 MG tablet  · dexamethasone 6 MG tablet  · ipratropium-albuterol 0.5-2.5 (3) MG/3ML Soln nebulizer solution  · vitamin D 50 MCG (2000 UT) Tabs tablet           Physical Exam:    Vitals:  Vitals:    09/11/21 0845 09/11/21 1118 09/11/21 1559 09/11/21 1612   BP: 119/67 (!) 133/91 128/60    Pulse: 65 70 62    Resp: 18 18 18    Temp: 97.7 °F (36.5 °C) 97.8 °F (36.6 °C) 98.3 °F (36.8 °C) TempSrc: Oral Oral Oral    SpO2: 93% 90% (!) 89% 90%   Weight:       Height:         Weight: Weight: 208 lb 8 oz (94.6 kg)     24 hour intake/output:    Intake/Output Summary (Last 24 hours) at 9/11/2021 1625  Last data filed at 9/11/2021 1413  Gross per 24 hour   Intake 900 ml   Output 0 ml   Net 900 ml       General appearance - alert, well appearing, and in no distress  Chest - clear to auscultation, no wheezes, rales or rhonchi, symmetric air entry  Heart - normal rate, regular rhythm, normal S1, S2, no murmurs, rubs, clicks or gallops  Abdomen - soft, nontender, nondistended, no masses or organomegaly  Obese: No; Protuberant: No   Neurological - alert, oriented, normal speech, no focal findings or movement disorder noted  Extremities - peripheral pulses normal, no pedal edema, no clubbing or cyanosis  Skin - normal coloration and turgor, no rashes, no suspicious skin lesions noted    Procedures:  None    Diagnostic Test:  CBC, BMP, serial ferritin    Radiology reports as per the Radiologist  Radiology: XR CHEST PORTABLE    Result Date: 9/6/2021  PROCEDURE: XR CHEST PORTABLE CLINICAL INFORMATION: shortness of breath. COMPARISON: Chest radiograph dated 8/28/2021. TECHNIQUE: AP upright view of the chest. FINDINGS: There are linear opacities at the left midlung and mild linear opacities at the bilateral lung bases. There is mild interstitial prominence in these areas. Pulmonary vasculature and cardiac-based also what are within normal limits. Visualized osseous structures appear grossly intact. Mild patchy areas of linear opacities and interstitial prominence at the left midlung and bilateral lung bases as evidence for pneumonitis. **This report has been created using voice recognition software. It may contain minor errors which are inherent in voice recognition technology. ** Final report electronically signed by Dr. Vicky Pizarro MD on 9/6/2021 11:49 AM       Results for orders placed or performed during the hospital encounter of 09/06/21   CBC auto differential   Result Value Ref Range    WBC 8.3 4.8 - 10.8 thou/mm3    RBC 5.18 4.70 - 6.10 mill/mm3    Hemoglobin 17.2 14.0 - 18.0 gm/dl    Hematocrit 51.7 42.0 - 52.0 %    MCV 99.8 (H) 80.0 - 94.0 fL    MCH 33.2 (H) 26.0 - 33.0 pg    MCHC 33.3 32.2 - 35.5 gm/dl    RDW-CV 14.3 11.5 - 14.5 %    RDW-SD 53.6 (H) 35.0 - 45.0 fL    Platelets 365 015 - 600 thou/mm3    MPV 9.6 9.4 - 12.4 fL    Seg Neutrophils 87.5 %    Lymphocytes 6.0 %    Monocytes 4.0 %    Eosinophils 0.1 %    Basophils 0.5 %    Immature Granulocytes 1.9 %    Segs Absolute 7.3 1 - 7 thou/mm3    Lymphocytes Absolute 0.5 (L) 1.0 - 4.8 thou/mm3    Monocytes Absolute 0.3 (L) 0.4 - 1.3 thou/mm3    Eosinophils Absolute 0.0 0.0 - 0.4 thou/mm3    Basophils Absolute 0.0 0.0 - 0.1 thou/mm3    Immature Grans (Abs) 0.16 (H) 0.00 - 0.07 thou/mm3    nRBC 0 /100 wbc   Brain Natriuretic Peptide   Result Value Ref Range    Pro-.4 0.0 - 900.0 pg/mL   Comprehensive Metabolic Panel w/ Reflex to MG   Result Value Ref Range    Glucose 73 70 - 108 mg/dL    CREATININE 1.1 0.4 - 1.2 mg/dL    BUN 24 (H) 7 - 22 mg/dL    Sodium 127 (L) 135 - 145 meq/L    Potassium reflex Magnesium 4.2 3.5 - 5.2 meq/L    Chloride 87 (L) 98 - 111 meq/L    CO2 26 23 - 33 meq/L    Calcium 10.0 8.5 - 10.5 mg/dL    AST 74 (H) 5 - 40 U/L    Alkaline Phosphatase 81 38 - 126 U/L    Total Protein 7.4 6.1 - 8.0 g/dL    Albumin 3.8 3.5 - 5.1 g/dL    Total Bilirubin 0.9 0.3 - 1.2 mg/dL    ALT 93 (H) 11 - 66 U/L   Troponin   Result Value Ref Range    Troponin T < 0.010 ng/ml   Anion Gap   Result Value Ref Range    Anion Gap 14.0 8.0 - 16.0 meq/L   Glomerular Filtration Rate, Estimated   Result Value Ref Range    Est, Glom Filt Rate 68 (A) ml/min/1.73m2   Osmolality   Result Value Ref Range    Osmolality Calc 257.8 (L) 275.0 - 300.0 mOsmol/kg   Ferritin   Result Value Ref Range    Ferritin 813 (H) 22 - 322 ng/mL   D-dimer, quantitative   Result Value Ref Range    D-Dimer, Quant 675.00 (H) 0.00 - 500.00 ng/ml FEU   C-reactive protein   Result Value Ref Range    CRP 25.12 (H) 0.00 - 1.00 mg/dl   Lactate dehydrogenase   Result Value Ref Range     (H) 100 - 190 U/L   Comprehensive Metabolic Panel w/ Reflex to MG   Result Value Ref Range    Glucose 132 (H) 70 - 108 mg/dL    CREATININE 0.8 0.4 - 1.2 mg/dL    BUN 26 (H) 7 - 22 mg/dL    Sodium 131 (L) 135 - 145 meq/L    Potassium reflex Magnesium 5.3 (H) 3.5 - 5.2 meq/L    Chloride 97 (L) 98 - 111 meq/L    CO2 21 (L) 23 - 33 meq/L    Calcium 9.1 8.5 - 10.5 mg/dL    AST 61 (H) 5 - 40 U/L    Alkaline Phosphatase 73 38 - 126 U/L    Total Protein 6.8 6.1 - 8.0 g/dL    Albumin 3.1 (L) 3.5 - 5.1 g/dL    Total Bilirubin 0.4 0.3 - 1.2 mg/dL    ALT 72 (H) 11 - 66 U/L   CBC Auto Differential   Result Value Ref Range    WBC 5.6 4.8 - 10.8 thou/mm3    RBC 5.01 4.70 - 6.10 mill/mm3    Hemoglobin 16.8 14.0 - 18.0 gm/dl    Hematocrit 52.6 (H) 42.0 - 52.0 %    .0 (H) 80.0 - 94.0 fL    MCH 33.5 (H) 26.0 - 33.0 pg    MCHC 31.9 (L) 32.2 - 35.5 gm/dl    RDW-CV 14.4 11.5 - 14.5 %    RDW-SD 55.8 (H) 35.0 - 45.0 fL    Platelets 553 (L) 782 - 400 thou/mm3    MPV 9.4 9.4 - 12.4 fL    Seg Neutrophils 86.1 %    Lymphocytes 4.9 %    Monocytes 4.9 %    Eosinophils 0.0 %    Basophils 0.5 %    Immature Granulocytes 3.6 %    Segs Absolute 4.8 1 - 7 thou/mm3    Lymphocytes Absolute 0.3 (L) 1.0 - 4.8 thou/mm3    Monocytes Absolute 0.3 (L) 0.4 - 1.3 thou/mm3    Eosinophils Absolute 0.0 0.0 - 0.4 thou/mm3    Basophils Absolute 0.0 0.0 - 0.1 thou/mm3    Immature Grans (Abs) 0.20 (H) 0.00 - 0.07 thou/mm3    nRBC 0 /100 wbc   Anion Gap   Result Value Ref Range    Anion Gap 13.0 8.0 - 16.0 meq/L   Glomerular Filtration Rate, Estimated   Result Value Ref Range    Est, Glom Filt Rate >90 ml/min/1.73m2   CBC auto differential   Result Value Ref Range    WBC 9.3 4.8 - 10.8 thou/mm3    RBC 4.92 4.70 - 6.10 mill/mm3    Hemoglobin 16.8 14.0 - 18.0 gm/dl Hematocrit 50.0 42.0 - 52.0 %    .6 (H) 80.0 - 94.0 fL    MCH 34.1 (H) 26.0 - 33.0 pg    MCHC 33.6 32.2 - 35.5 gm/dl    RDW-CV 13.8 11.5 - 14.5 %    RDW-SD 52.1 (H) 35.0 - 45.0 fL    Platelets 995 534 - 977 thou/mm3    MPV 9.6 9.4 - 12.4 fL    Seg Neutrophils 85.4 %    Lymphocytes 7.3 %    Monocytes 6.3 %    Eosinophils 0.1 %    Basophils 0.1 %    Immature Granulocytes 0.8 %    Segs Absolute 7.9 (H) 1 - 7 thou/mm3    Lymphocytes Absolute 0.7 (L) 1.0 - 4.8 thou/mm3    Monocytes Absolute 0.6 0.4 - 1.3 thou/mm3    Eosinophils Absolute 0.0 0.0 - 0.4 thou/mm3    Basophils Absolute 0.0 0.0 - 0.1 thou/mm3    Immature Grans (Abs) 0.07 0.00 - 0.07 thou/mm3    nRBC 0 /100 wbc   Basic Metabolic Panel   Result Value Ref Range    Sodium 133 (L) 135 - 145 meq/L    Potassium 4.8 3.5 - 5.2 meq/L    Chloride 96 (L) 98 - 111 meq/L    CO2 27 23 - 33 meq/L    Glucose 130 (H) 70 - 108 mg/dL    BUN 19 7 - 22 mg/dL    CREATININE 0.8 0.4 - 1.2 mg/dL    Calcium 9.2 8.5 - 10.5 mg/dL   Lactate dehydrogenase   Result Value Ref Range     (H) 100 - 190 U/L   Ferritin   Result Value Ref Range    Ferritin 762 (H) 22 - 322 ng/mL   C-reactive protein   Result Value Ref Range    CRP 4.82 (H) 0.00 - 1.00 mg/dl   Anion Gap   Result Value Ref Range    Anion Gap 10.0 8.0 - 16.0 meq/L   Glomerular Filtration Rate, Estimated   Result Value Ref Range    Est, Glom Filt Rate >90 SF/KMG/3.99G7   Basic Metabolic Panel   Result Value Ref Range    Sodium 136 135 - 145 meq/L    Potassium 5.5 (H) 3.5 - 5.2 meq/L    Chloride 97 (L) 98 - 111 meq/L    CO2 32 23 - 33 meq/L    Glucose 185 (H) 70 - 108 mg/dL    BUN 16 7 - 22 mg/dL    CREATININE 0.8 0.4 - 1.2 mg/dL    Calcium 9.5 8.5 - 10.5 mg/dL   CBC Auto Differential   Result Value Ref Range    WBC 6.5 4.8 - 10.8 thou/mm3    RBC 4.53 (L) 4.70 - 6.10 mill/mm3    Hemoglobin 15.2 14.0 - 18.0 gm/dl    Hematocrit 46.1 42.0 - 52.0 %    .8 (H) 80.0 - 94.0 fL    MCH 33.6 (H) 26.0 - 33.0 pg    MCHC 33.0 32.2 - 35.5 gm/dl    RDW-CV 13.5 11.5 - 14.5 %    RDW-SD 51.4 (H) 35.0 - 45.0 fL    Platelets 826 697 - 307 thou/mm3    MPV 9.7 9.4 - 12.4 fL    Seg Neutrophils 82.8 %    Lymphocytes 11.2 %    Monocytes 4.4 %    Eosinophils 0.6 %    Basophils 0.2 %    Immature Granulocytes 0.8 %    Segs Absolute 5.4 1 - 7 thou/mm3    Lymphocytes Absolute 0.7 (L) 1.0 - 4.8 thou/mm3    Monocytes Absolute 0.3 (L) 0.4 - 1.3 thou/mm3    Eosinophils Absolute 0.0 0.0 - 0.4 thou/mm3    Basophils Absolute 0.0 0.0 - 0.1 thou/mm3    Immature Grans (Abs) 0.05 0.00 - 0.07 thou/mm3    nRBC 0 /100 wbc   Ferritin   Result Value Ref Range    Ferritin 699 (H) 22 - 322 ng/mL   C-reactive protein   Result Value Ref Range    CRP 10.87 (H) 0.00 - 1.00 mg/dl   Lactate dehydrogenase   Result Value Ref Range     (H) 100 - 190 U/L   Anion Gap   Result Value Ref Range    Anion Gap 7.0 (L) 8.0 - 16.0 meq/L   Glomerular Filtration Rate, Estimated   Result Value Ref Range    Est, Glom Filt Rate >90 ml/min/1.73m2   Potassium   Result Value Ref Range    Potassium 5.4 (H) 3.5 - 5.2 meq/L   POCT Glucose   Result Value Ref Range    POC Glucose 77 70 - 108 mg/dl   POCT Glucose   Result Value Ref Range    POC Glucose 176 (H) 70 - 108 mg/dl   POCT Glucose   Result Value Ref Range    POC Glucose 128 (H) 70 - 108 mg/dl   POCT Glucose   Result Value Ref Range    POC Glucose 213 (H) 70 - 108 mg/dl   POCT Glucose   Result Value Ref Range    POC Glucose 279 (H) 70 - 108 mg/dl   POCT Glucose   Result Value Ref Range    POC Glucose 283 (H) 70 - 108 mg/dl   POCT Glucose   Result Value Ref Range    POC Glucose 163 (H) 70 - 108 mg/dl   POCT Glucose   Result Value Ref Range    POC Glucose 254 (H) 70 - 108 mg/dl   POCT Glucose   Result Value Ref Range    POC Glucose 238 (H) 70 - 108 mg/dl   POCT Glucose   Result Value Ref Range    POC Glucose 282 (H) 70 - 108 mg/dl   POCT Glucose   Result Value Ref Range    POC Glucose 132 (H) 70 - 108 mg/dl   POCT Glucose   Result Value Ref Range    POC Glucose 282 (H) 70 - 108 mg/dl   POCT Glucose   Result Value Ref Range    POC Glucose 298 (H) 70 - 108 mg/dl   POCT Glucose   Result Value Ref Range    POC Glucose 328 (H) 70 - 108 mg/dl   POCT Glucose   Result Value Ref Range    POC Glucose 197 (H) 70 - 108 mg/dl   POCT Glucose   Result Value Ref Range    POC Glucose 291 (H) 70 - 108 mg/dl   POCT Glucose   Result Value Ref Range    POC Glucose 276 (H) 70 - 108 mg/dl   POCT Glucose   Result Value Ref Range    POC Glucose 305 (H) 70 - 108 mg/dl   POCT Glucose   Result Value Ref Range    POC Glucose 136 (H) 70 - 108 mg/dl   POCT Glucose   Result Value Ref Range    POC Glucose 354 (H) 70 - 108 mg/dl   EKG SOB   Result Value Ref Range    Ventricular Rate 83 BPM    Atrial Rate 83 BPM    P-R Interval 144 ms    QRS Duration 164 ms    Q-T Interval 388 ms    QTc Calculation (Bazett) 455 ms    P Axis 45 degrees    R Axis -84 degrees    T Axis 63 degrees       Diet:  ADULT DIET;  Regular; 5 carb choices (75 gm/meal)    Activity:  Up ad kayode (Patient can move independently)    Follow-up:  in 1-2 weeks with Becca Gaytan MD    Disposition: home    Condition: Stable    Time Spent: 35 minutes    Electronically signed by Yaritza Jenkins MD on 9/11/2021 at 4:25 PM    Discharging Hospitalist

## 2021-09-11 NOTE — PROGRESS NOTES
Hospitalist Progress Note    Patient:  Stevie Zhao      Unit/Bed:8B-28/028-A    YOB: 1960    MRN: [id number]       Acct: [de-identified]     PCP: Erinn Calvillo MD    Date of Admission: 9/6/2021      Subjective: Patient seen and examined. Patient feels a little frustrated secondary to his oxygen levels needing to be increased to 5 L from 4 L. Patient states that he has been doing well with using Acapella and incentive spirometer. Patient states that he is eating and sleeping okay. Patient reports that when he gets his nebulizer treatments, he feels his airways open up. Medications:  Reviewed    Infusion Medications    sodium chloride Stopped (09/06/21 2200)    sodium chloride      dextrose       Scheduled Medications    ipratropium-albuterol  1 ampule Inhalation Q4H WA    albuterol sulfate HFA  2 puff Inhalation Q4H WA    budesonide-formoterol  2 puff Inhalation BID    [Held by provider] tiotropium  2 puff Inhalation Daily    finasteride  5 mg Oral Daily    sodium chloride flush  5-40 mL IntraVENous 2 times per day    enoxaparin  30 mg SubCUTAneous BID    dexamethasone  6 mg Oral Daily    Vitamin D  2,000 Units Oral Daily    ascorbic acid  500 mg Oral Daily    zinc sulfate  50 mg Oral Daily    insulin lispro  0-6 Units SubCUTAneous TID WC    insulin lispro  0-3 Units SubCUTAneous Nightly     PRN Meds: ipratropium-albuterol, sodium chloride flush, sodium chloride, ondansetron **OR** ondansetron, polyethylene glycol, acetaminophen **OR** acetaminophen, glucose, dextrose, glucagon (rDNA), dextrose      Intake/Output Summary (Last 24 hours) at 9/10/2021 2159  Last data filed at 9/10/2021 2155  Gross per 24 hour   Intake 220 ml   Output 0 ml   Net 220 ml       Diet:  ADULT DIET;  Regular; 5 carb choices (75 gm/meal)    Exam:  BP (!) 118/57   Pulse 67   Temp 98 °F (36.7 °C) (Oral)   Resp 18   Ht 5' 10\" (1.778 m)   Wt 208 lb 8 oz (94.6 kg)   SpO2 92%   BMI 29.92 kg/m² Physical Exam  Constitutional:       Appearance: Normal appearance. HENT:      Head: Normocephalic and atraumatic. Right Ear: External ear normal.      Left Ear: External ear normal.      Nose: Nose normal.      Mouth/Throat:      Pharynx: Oropharynx is clear. Eyes:      Extraocular Movements: Extraocular movements intact. Pupils: Pupils are equal, round, and reactive to light. Cardiovascular:      Rate and Rhythm: Normal rate and regular rhythm. Pulses: Normal pulses. Heart sounds: Normal heart sounds. No murmur heard. No friction rub. No gallop. Pulmonary:      Effort: Pulmonary effort is normal. No respiratory distress. Breath sounds: Normal breath sounds. No wheezing, rhonchi or rales. Abdominal:      General: Bowel sounds are normal. There is no distension. Tenderness: There is no abdominal tenderness. Musculoskeletal:         General: No swelling. Normal range of motion. Cervical back: Normal range of motion and neck supple. Skin:     General: Skin is warm. Neurological:      Mental Status: He is alert. Labs:   Recent Labs     09/09/21  0658   WBC 9.3   HGB 16.8   HCT 50.0        Recent Labs     09/09/21  0658   *   K 4.8   CL 96*   CO2 27   BUN 19   CREATININE 0.8   CALCIUM 9.2     No results for input(s): AST, ALT, BILIDIR, BILITOT, ALKPHOS in the last 72 hours. No results for input(s): INR in the last 72 hours. No results for input(s): [place] in the last 72 hours. Urinalysis:    No results found for: Delorse [place], BACTERIA, RBCUA, BLOODU, [address]    Radiology:  XR CHEST PORTABLE   Final Result   Mild patchy areas of linear opacities and interstitial prominence at the left midlung and bilateral lung bases as evidence for pneumonitis. **This report has been created using voice recognition software. It may contain minor errors which are inherent in voice recognition technology. **      Final report electronically signed by Dr. Nicolette Garcia MD on 9/6/2021 11:49 AM          Diet: ADULT DIET; Regular; 5 carb choices (75 gm/meal)    DVT prophylaxis: [x] Lovenox                                 [] SCDs                                 [] SQ Heparin                                 [] Encourage ambulation           [] Already on Anticoagulation     Disposition:    [x] Home       [] TCU       [] Rehab       [] Psych       [] SNF       [] [place]       [] Other-    Code Status: Full Code    PT/OT Eval Status: None ordered    Assessment/Plan:    Anticipated Discharge in : 1 to 2 days    Active Hospital Problems    Diagnosis Date Noted    Acute respiratory failure with hypoxia ([facility] [J96.01] 09/07/2021     Priority: High    COVID-19 virus infection [U07.1] 09/06/2021     Priority: High    COPD (chronic obstructive [facility] disease) [facility] [J44.9] 09/07/2021     Priority: Medium    BPH (benign prostatic hyperplasia) [N40.0] 09/07/2021     Priority: Low    Type 2 diabetes mellitus, without long-term current use of insulin ([facility] [E11.9] 03/05/2020     Priority: Low       1. Acute respiratory failure with hypoxia-patient reports that he had oxygen saturations in the 80s at home. Patient noted to have an oxygen saturation of 89% on room air when he first came to the ED. Patient placed on 3 L of oxygen in the ED. Patient's oxygen has trended up to 6 L. We will attempt to wean patient's oxygen as able. Patient does have underlying COPD. 2. COVID-19 infection-patient noted to be positive for COVID-19 1 week ago. Patient reports that he underwent a round of steroids at home, which he states was helpful with his symptoms. Patient noted to have difficulties with his breathing 7 days after symptoms started. Patient will be treated for COVID-19 with dexamethasone. Patient will also be placed on supplements with zinc, vitamin C and vitamin D.   We will follow inflammatory markers during hospitalization. Patient came out of isolation on 9/9/2021. Patient encouraged to continue using Acapella and incentive spirometer during hospitalization. 3. COPD-patient with a history of COPD. Patient does not have any wheezing on exam.  Patient does not appear to be in COPD exacerbation. Due to patient's underlying COPD, patient will be continued on his inhalers. Unfortunately, since patient has COVID-19; we will not be able to give patient his nebulizer treatments for now. If patient is in a negative pressure room, we can consider starting nebulizer treatments. Patient comes out of isolation on 9/9/2021 will be able to be started on nebulizer treatments at that time. Patient resumed on nebulizer treatments on 9/9/2021. Patient reports improvement in his breathing after nebulizer treatment. 4. BPH-patient with history of BPH. Patient will be continued on finasteride. 5. Type 2 diabetes-patient with history of type 2 diabetes. Patient placed on a insulin sliding scale. Accu-Cheks as scheduled.         Electronically signed by Lesli Ramirez MD on 9/10/2021 at 9:59 PM

## 2021-09-11 NOTE — PROGRESS NOTES
A home oxygen evaluation has been completed. [x]Patient is an inpatient. It is expected that the patient will be discharged within the next 48 hours. Qualified provider to write order for home prescription if patient qualifies. Social service/care managers will arrange for home oxygen. If patient is active, arrange for Home Medical supplier to assess for Oxygen Conserving Device per pulse oximetry. []Patient is an outpatient. Results will be faxed to the ordering provider. Qualified provider to write order for home prescription if patient qualifies and arranges for home oxygen. Patient was placed on room air for 30 minutes. SpO2 was 90% on room air at rest. Patients SpO2 was 89% or above and did not qualify for home oxygen. Patient was walked for 5 minutes. SpO2 was 87% during walking. Patients SpO2 was below 89% and qualified for home oxygen. Oxygen was applied at 2 lpm via nasal cannula to maintain a SpO2 between 90-92% while walking. Actual SpO2 was 91%. Nurse notified of results.

## 2022-02-08 ENCOUNTER — HOSPITAL ENCOUNTER (OUTPATIENT)
Age: 62
Discharge: HOME | End: 2022-02-08
Payer: COMMERCIAL

## 2022-02-08 LAB
ALBUMIN SERPL-MCNC: 4.2 G/DL (ref 3.5–5.1)
ALP BLD-CCNC: 79 U/L (ref 38–126)
ALT SERPL-CCNC: 22 U/L (ref 11–66)
ANION GAP SERPL CALCULATED.3IONS-SCNC: 11 MEQ/L (ref 8–16)
AST SERPL-CCNC: 19 U/L (ref 5–40)
AVERAGE GLUCOSE: 129 MG/DL (ref 70–126)
BASOPHILS # BLD: 0.8 %
BASOPHILS ABSOLUTE: 0 THOU/MM3 (ref 0–0.1)
BILIRUB SERPL-MCNC: 0.5 MG/DL (ref 0.3–1.2)
BILIRUBIN DIRECT: < 0.2 MG/DL (ref 0–0.3)
BUN BLDV-MCNC: 15 MG/DL (ref 7–22)
CALCIUM SERPL-MCNC: 9 MG/DL (ref 8.5–10.5)
CHLORIDE BLD-SCNC: 97 MEQ/L (ref 98–111)
CHOLESTEROL, TOTAL: 170 MG/DL (ref 100–199)
CO2: 28 MEQ/L (ref 23–33)
CREAT SERPL-MCNC: 1 MG/DL (ref 0.4–1.2)
CREATININE, URINE: 88.7 MG/DL
EOSINOPHIL # BLD: 2.8 %
EOSINOPHILS ABSOLUTE: 0.2 THOU/MM3 (ref 0–0.4)
ERYTHROCYTE [DISTWIDTH] IN BLOOD BY AUTOMATED COUNT: 13.3 % (ref 11.5–14.5)
ERYTHROCYTE [DISTWIDTH] IN BLOOD BY AUTOMATED COUNT: 51 FL (ref 35–45)
GFR SERPL CREATININE-BSD FRML MDRD: 75 ML/MIN/1.73M2
GLUCOSE FASTING: 143 MG/DL (ref 70–108)
HBA1C MFR BLD: 6.3 % (ref 4.4–6.4)
HCT VFR BLD CALC: 52.1 % (ref 42–52)
HDLC SERPL-MCNC: 49 MG/DL
HEMOGLOBIN: 16.9 GM/DL (ref 14–18)
IMMATURE GRANS (ABS): 0.04 THOU/MM3 (ref 0–0.07)
IMMATURE GRANULOCYTES: 0.7 %
LDL CHOLESTEROL CALCULATED: 95 MG/DL
LYMPHOCYTES # BLD: 18.5 %
LYMPHOCYTES ABSOLUTE: 1.1 THOU/MM3 (ref 1–4.8)
MCH RBC QN AUTO: 33.7 PG (ref 26–33)
MCHC RBC AUTO-ENTMCNC: 32.4 GM/DL (ref 32.2–35.5)
MCV RBC AUTO: 104 FL (ref 80–94)
MICROALBUMIN UR-MCNC: 4.74 MG/DL
MICROALBUMIN/CREAT UR-RTO: 53 MG/G (ref 0–30)
MONOCYTES # BLD: 10.6 %
MONOCYTES ABSOLUTE: 0.6 THOU/MM3 (ref 0.4–1.3)
NUCLEATED RED BLOOD CELLS: 0 /100 WBC
PLATELET # BLD: 198 THOU/MM3 (ref 130–400)
PMV BLD AUTO: 9.1 FL (ref 9.4–12.4)
POTASSIUM SERPL-SCNC: 4.7 MEQ/L (ref 3.5–5.2)
PROSTATE SPECIFIC ANTIGEN: 0.22 NG/ML (ref 0–1)
RBC # BLD: 5.01 MILL/MM3 (ref 4.7–6.1)
SEG NEUTROPHILS: 66.6 %
SEGMENTED NEUTROPHILS ABSOLUTE COUNT: 4.1 THOU/MM3 (ref 1.8–7.7)
SODIUM BLD-SCNC: 136 MEQ/L (ref 135–145)
TOTAL PROTEIN: 7.2 G/DL (ref 6.1–8)
TRIGL SERPL-MCNC: 128 MG/DL (ref 0–199)
WBC # BLD: 6.1 THOU/MM3 (ref 4.8–10.8)

## 2022-02-08 PROCEDURE — 80048 BASIC METABOLIC PNL TOTAL CA: CPT

## 2022-02-08 PROCEDURE — 85025 COMPLETE CBC W/AUTO DIFF WBC: CPT

## 2022-02-08 PROCEDURE — 82043 UR ALBUMIN QUANTITATIVE: CPT

## 2022-02-08 PROCEDURE — 80076 HEPATIC FUNCTION PANEL: CPT

## 2022-02-08 PROCEDURE — 36415 COLL VENOUS BLD VENIPUNCTURE: CPT

## 2022-02-08 PROCEDURE — 80061 LIPID PANEL: CPT

## 2022-02-08 PROCEDURE — G0103 PSA SCREENING: HCPCS

## 2022-02-08 PROCEDURE — 83036 HEMOGLOBIN GLYCOSYLATED A1C: CPT

## 2022-11-14 ENCOUNTER — HOSPITAL ENCOUNTER (EMERGENCY)
Age: 62
Discharge: HOME | End: 2022-11-14
Payer: COMMERCIAL

## 2022-11-14 VITALS
HEART RATE: 74 BPM | RESPIRATION RATE: 18 BRPM | BODY MASS INDEX: 30.42 KG/M2 | WEIGHT: 212 LBS | OXYGEN SATURATION: 95 % | DIASTOLIC BLOOD PRESSURE: 66 MMHG | TEMPERATURE: 97.6 F | SYSTOLIC BLOOD PRESSURE: 141 MMHG

## 2022-11-14 DIAGNOSIS — J40 BRONCHITIS: Primary | ICD-10-CM

## 2022-11-14 PROCEDURE — 99213 OFFICE O/P EST LOW 20 MIN: CPT

## 2022-11-14 PROCEDURE — 99213 OFFICE O/P EST LOW 20 MIN: CPT | Performed by: EMERGENCY MEDICINE

## 2022-11-14 RX ORDER — PREDNISONE 10 MG/1
TABLET ORAL
Qty: 15 TABLET | Refills: 0 | Status: SHIPPED | OUTPATIENT
Start: 2022-11-14 | End: 2022-11-19

## 2022-11-14 RX ORDER — DEXTROMETHORPHAN HYDROBROMIDE AND PROMETHAZINE HYDROCHLORIDE 15; 6.25 MG/5ML; MG/5ML
5 SYRUP ORAL 4 TIMES DAILY PRN
Qty: 118 ML | Refills: 0 | Status: SHIPPED | OUTPATIENT
Start: 2022-11-14 | End: 2022-11-21

## 2022-11-14 ASSESSMENT — ENCOUNTER SYMPTOMS
SINUS PAIN: 0
COUGH: 1
WHEEZING: 1
SHORTNESS OF BREATH: 0
RHINORRHEA: 0

## 2022-11-14 NOTE — ED PROVIDER NOTES
[facility]  [facility]       CHIEF COMPLAINT       Chief Complaint   Patient presents with    Cough       Nurses Notes reviewed and I agree except as noted in the HPI. HISTORY OF PRESENT ILLNESS   Omar Castro is a 60 y.o. male who presents for complaints of a dry cough. Patient states this began 3 days ago. He states the cough is occurring more frequently over the past 24 hours. He reports he has a history of COPD and has developed severe symptoms in the past with his COPD. He has also had pneumonia a few times in the past.  The patient denies any fevers. He is not currently short of breath. Patient states he tried to get in with his primary care provider today who is out of the office. He is being evaluated as he does not want his symptoms to progress to a worsening state. HPI    REVIEW OF SYSTEMS     Review of Systems   Constitutional:  Negative for activity change, fatigue and fever. HENT:  Negative for congestion, rhinorrhea and sinus pain. Respiratory:  Positive for cough and wheezing (improves with albuterol inhaler). Negative for shortness of breath. Neurological:  Negative for dizziness and headaches. Psychiatric/Behavioral:  Negative for behavioral problems. PAST MEDICAL HISTORY         Diagnosis Date    Asthma     COPD (chronic obstructive pulmonary disease) ([facility] [address]        SURGICALHISTORY     Patient  has a past surgical history that includes Appendectomy and hernia repair.     CURRENT MEDICATIONS       Discharge Medication List as of 11/14/2022  8:59 AM        CONTINUE these medications which have NOT CHANGED    Details   ipratropium-albuterol (DUONEB) 0.5-2.5 (3) MG/3ML SOLN nebulizer solution Inhale 3 mLs into the lungs every 4 hours as needed for Shortness of Breath, Disp-360 mL, R-3Normal      ascorbic acid (VITAMIN C) 500 MG tablet Take 1 tablet by mouth daily, Disp-30 tablet, R-3Normal      Vitamin D (CHOLECALCIFEROL) 50 MCG (2000 UT) TABS tablet Take 1 tablet by mouth daily, Disp-60 tablet, R-1Labeling may look different. 25 mcg=1000 Units. Please double check dosages. Normal      budesonide-formoterol (SYMBICORT) 80-4.5 MCG/ACT AERO Inhale 2 puffs into the lungs 4 times dailyHistorical Med      aclidinium (TUDORZA PRESSAIR) 400 MCG/ACT AEPB inhaler Inhale 1 puff into the lungs 2 times dailyHistorical Med      finasteride (PROSCAR) 5 MG tablet Take 5 mg by mouth dailyHistorical Med      metFORMIN (GLUCOPHAGE) 1000 MG tablet Take 1,000 mg by mouth 2 times daily (with meals)Historical Med      glimepiride (AMARYL) 1 MG tablet Take 1 mg by mouth 2 times dailyHistorical Med      Empagliflozin-Linagliptin (GLYXAMBI) 10-5 MG TABS Take 10 mg by mouth dailyHistorical Med      Albuterol Sulfate (PROAIR HFA IN) Inhale 1-2 puffs into the lungs as neededHistorical Med      albuterol (PROVENTIL) (2.5 MG/3ML) 0.083% nebulizer solution Take 2.5 mg by nebulization every 6 hours as needed for [address]     Patient is is allergic to amoxicillin. Patients   Immunization History   Administered Date(s) Administered    Influenza Virus Vaccine 09/05/2019       FAMILY HISTORY     Patient's family history is not on file. SOCIAL HISTORY     Patient  reports that he quit smoking about 7 years ago. His smoking use included cigarettes. He smoked an average of .75 packs per day. He has never used smokeless tobacco. He reports that he does not drink alcohol and does not use drugs. PHYSICAL EXAM     ED TRIAGE VITALS  BP: (!) 141/66, Temp: 97.6 °F (36.4 °C), Heart Rate: 74, Resp: 18, SpO2: 95 %,Estimated body mass index is 30.42 kg/m² as calculated from the following:    Height as of 9/6/21: 5' 10\" (1.778 m). Weight as of this encounter: 212 lb (96.2 kg). ,No LMP for male patient. Physical Exam  Constitutional:       General: He is not in acute distress. Appearance: He is normal weight. He is not ill-appearing.    HENT: Head: Normocephalic. Nose: No congestion or rhinorrhea. Mouth/Throat:      Mouth: Mucous membranes are moist.      Pharynx: Oropharynx is clear. Cardiovascular:      Rate and Rhythm: Normal rate and regular rhythm. Pulses: Normal pulses. Heart sounds: Normal heart sounds. Pulmonary:      Effort: Pulmonary effort is normal. No respiratory distress. Breath sounds: Decreased breath sounds present. Skin:     General: Skin is warm and dry. Neurological:      General: No focal deficit present. Mental Status: He is alert. Psychiatric:         Mood and Affect: Mood normal.         Behavior: Behavior normal.       DIAGNOSTIC RESULTS     Labs:No results found for this visit on 11/14/22. IMAGING:    No orders to display         EKG:      URGENT CARE COURSE:     Vitals:    11/14/22 0838   BP: (!) 141/66   Pulse: 74   Resp: 18   Temp: 97.6 °F (36.4 °C)   TempSrc: Temporal   SpO2: 95%   Weight: 212 lb (96.2 kg)       Medications - No data to display         PROCEDURES:  None    FINAL IMPRESSION      1. Bronchitis          DISPOSITION/ PLAN     Patient presents for what is likely bronchitis. I did offer COVID testing and patient declines. He states he has some at home test that he will use if his symptoms worsen. We will treat his current symptoms with prednisone and Promethazine DM cough syrup. Advised to drink plenty of fluids. Follow-up primary care provider return here if symptoms have not improved in 5 days. Sooner if worse.       PATIENT REFERRED TO:  Britney Cruz MD  [address] [address]      DISCHARGE MEDICATIONS:  Discharge Medication List as of 11/14/2022  8:59 AM        START taking these medications    Details   predniSONE (DELTASONE) 10 MG tablet Take 5 tablets by mouth daily for 1 day, THEN 4 tablets daily for 1 day, THEN 3 tablets daily for 1 day, THEN 2 tablets daily for 1 day, THEN 1 tablet daily for 1 day., Disp-15 tablet, R-0Normal promethazine-dextromethorphan (PROMETHAZINE-DM) 6.25-15 MG/5ML syrup Take 5 mLs by mouth 4 times daily as needed for Cough, Disp-118 mL, R-0Normal             Discharge Medication List as of 11/14/2022  8:59 AM          Discharge Medication List as of 11/14/2022  8:59 AM          ADDY Jha CNP    (Please note that portions of this note were completed with a voice recognition program. Efforts were made to edit the dictations but occasionally words are mis-transcribed.)           ADDY Jha CNP  11/14/22 6111

## 2022-11-14 NOTE — DISCHARGE INSTRUCTIONS
Prednisone as directed    Drink plenty of fluid    Use your inhalers as needed    Promethazine DM as directed as needed for cough    Return for new or worsening symptoms

## 2022-11-14 NOTE — ED TRIAGE NOTES
Pt presents to [facility] with c/o started Friday with tickle in chest, headache, dry cough.   States he has had bronchitis multiple times in the past.

## 2023-02-15 ENCOUNTER — HOSPITAL ENCOUNTER (OUTPATIENT)
Age: 63
Discharge: HOME | End: 2023-02-15
Payer: COMMERCIAL

## 2023-02-15 LAB
ALBUMIN SERPL BCG-MCNC: 4.3 G/DL (ref 3.5–5.1)
ALP SERPL-CCNC: 102 U/L (ref 38–126)
ALT SERPL W/O P-5'-P-CCNC: 20 U/L (ref 11–66)
ANION GAP SERPL CALC-SCNC: 11 MEQ/L (ref 8–16)
AST SERPL-CCNC: 18 U/L (ref 5–40)
BASOPHILS ABSOLUTE: 0 THOU/MM3 (ref 0–0.1)
BASOPHILS NFR BLD AUTO: 0.6 %
BILIRUB CONJ SERPL-MCNC: < 0.2 MG/DL (ref 0–0.3)
BILIRUB SERPL-MCNC: 0.7 MG/DL (ref 0.3–1.2)
BUN SERPL-MCNC: 15 MG/DL (ref 7–22)
CALCIUM SERPL-MCNC: 9.3 MG/DL (ref 8.5–10.5)
CHLORIDE SERPL-SCNC: 97 MEQ/L (ref 98–111)
CHOLEST SERPL-MCNC: 177 MG/DL (ref 100–199)
CO2 SERPL-SCNC: 28 MEQ/L (ref 23–33)
CREAT SERPL-MCNC: 1.1 MG/DL (ref 0.4–1.2)
CREAT UR-MCNC: 93.9 MG/DL
DEPRECATED MEAN GLUCOSE BLD GHB EST-ACNC: 120 MG/DL (ref 70–126)
DEPRECATED RDW RBC AUTO: 53.2 FL (ref 35–45)
EOSINOPHIL NFR BLD AUTO: 1.9 %
EOSINOPHILS ABSOLUTE: 0.1 THOU/MM3 (ref 0–0.4)
ERYTHROCYTE [DISTWIDTH] IN BLOOD BY AUTOMATED COUNT: 13.8 % (ref 11.5–14.5)
GFR SERPL CREATININE-BSD FRML MDRD: > 60 ML/MIN/1.73M2
GLUCOSE FASTING: 106 MG/DL (ref 70–108)
HBA1C MFR BLD HPLC: 6 % (ref 4.4–6.4)
HCT VFR BLD AUTO: 48.4 % (ref 42–52)
HDLC SERPL-MCNC: 45 MG/DL
HGB BLD-MCNC: 16.1 GM/DL (ref 14–18)
IMM GRANULOCYTES # BLD AUTO: 0.04 THOU/MM3 (ref 0–0.07)
IMM GRANULOCYTES NFR BLD AUTO: 0.6 %
LDLC SERPL CALC-MCNC: 109 MG/DL
LYMPHOCYTES ABSOLUTE: 1.3 THOU/MM3 (ref 1–4.8)
LYMPHOCYTES NFR BLD AUTO: 20.8 %
MCH RBC QN AUTO: 34.5 PG (ref 26–33)
MCHC RBC AUTO-ENTMCNC: 33.3 GM/DL (ref 32.2–35.5)
MCV RBC AUTO: 103.9 FL (ref 80–94)
MICROALBUMIN UR-MCNC: 6.74 MG/DL
MICROALBUMIN/CREAT RATIO PNL UR: 72 MG/G (ref 0–30)
MONOCYTES ABSOLUTE: 0.8 THOU/MM3 (ref 0.4–1.3)
MONOCYTES NFR BLD AUTO: 12.9 %
NEUTROPHILS NFR BLD AUTO: 63.2 %
NRBC BLD AUTO-RTO: 0 /100 WBC
PLATELET # BLD AUTO: 225 THOU/MM3 (ref 130–400)
PMV BLD AUTO: 8.8 FL (ref 9.4–12.4)
POTASSIUM SERPL-SCNC: 5.2 MEQ/L (ref 3.5–5.2)
PROT SERPL-MCNC: 7.2 G/DL (ref 6.1–8)
PSA SERPL-MCNC: 0.31 NG/ML (ref 0–1)
RBC # BLD AUTO: 4.66 MILL/MM3 (ref 4.7–6.1)
SEGMENTED NEUTROPHILS ABSOLUTE COUNT: 4 THOU/MM3 (ref 1.8–7.7)
SODIUM SERPL-SCNC: 136 MEQ/L (ref 135–145)
TRIGL SERPL-MCNC: 115 MG/DL (ref 0–199)
WBC # BLD AUTO: 6.4 THOU/MM3 (ref 4.8–10.8)

## 2023-02-15 PROCEDURE — 82043 UR ALBUMIN QUANTITATIVE: CPT

## 2023-02-15 PROCEDURE — G0103 PSA SCREENING: HCPCS

## 2023-02-15 PROCEDURE — 80076 HEPATIC FUNCTION PANEL: CPT

## 2023-02-15 PROCEDURE — 80048 BASIC METABOLIC PNL TOTAL CA: CPT

## 2023-02-15 PROCEDURE — 83036 HEMOGLOBIN GLYCOSYLATED A1C: CPT

## 2023-02-15 PROCEDURE — 85025 COMPLETE CBC W/AUTO DIFF WBC: CPT

## 2023-02-15 PROCEDURE — 80061 LIPID PANEL: CPT

## 2023-02-15 PROCEDURE — 36415 COLL VENOUS BLD VENIPUNCTURE: CPT

## 2023-04-03 ENCOUNTER — HOSPITAL ENCOUNTER (EMERGENCY)
Age: 63
Discharge: HOME | End: 2023-04-03
Payer: COMMERCIAL

## 2023-04-03 VITALS
BODY MASS INDEX: 30.64 KG/M2 | DIASTOLIC BLOOD PRESSURE: 69 MMHG | HEIGHT: 70 IN | HEART RATE: 89 BPM | SYSTOLIC BLOOD PRESSURE: 119 MMHG | WEIGHT: 214 LBS | OXYGEN SATURATION: 96 % | TEMPERATURE: 98.8 F | RESPIRATION RATE: 16 BRPM

## 2023-04-03 DIAGNOSIS — S05.01XA ABRASION OF RIGHT CORNEA, INITIAL ENCOUNTER: Primary | ICD-10-CM

## 2023-04-03 PROCEDURE — 6360000002 HC RX W HCPCS: Performed by: NURSE PRACTITIONER

## 2023-04-03 PROCEDURE — 99213 OFFICE O/P EST LOW 20 MIN: CPT | Performed by: NURSE PRACTITIONER

## 2023-04-03 PROCEDURE — 99213 OFFICE O/P EST LOW 20 MIN: CPT

## 2023-04-03 PROCEDURE — 90471 IMMUNIZATION ADMIN: CPT | Performed by: NURSE PRACTITIONER

## 2023-04-03 PROCEDURE — 2500000003 HC RX 250 WO HCPCS: Performed by: NURSE PRACTITIONER

## 2023-04-03 PROCEDURE — 90715 TDAP VACCINE 7 YRS/> IM: CPT | Performed by: NURSE PRACTITIONER

## 2023-04-03 RX ORDER — PROPARACAINE HYDROCHLORIDE 5 MG/ML
1 SOLUTION/ DROPS OPHTHALMIC ONCE
Status: COMPLETED | OUTPATIENT
Start: 2023-04-03 | End: 2023-04-03

## 2023-04-03 RX ORDER — GENTAMICIN SULFATE 3 MG/ML
1 SOLUTION/ DROPS OPHTHALMIC 4 TIMES DAILY
Qty: 1 EACH | Refills: 0 | Status: SHIPPED | OUTPATIENT
Start: 2023-04-03 | End: 2023-04-08

## 2023-04-03 RX ADMIN — TETANUS TOXOID, REDUCED DIPHTHERIA TOXOID AND ACELLULAR PERTUSSIS VACCINE, ADSORBED 0.5 ML: 5; 2.5; 8; 8; 2.5 SUSPENSION INTRAMUSCULAR at 09:33

## 2023-04-03 RX ADMIN — PROPARACAINE HYDROCHLORIDE 1 DROP: 5 SOLUTION/ DROPS OPHTHALMIC at 09:33

## 2023-04-03 ASSESSMENT — ENCOUNTER SYMPTOMS
EYE PAIN: 1
NAUSEA: 0
TROUBLE SWALLOWING: 0
SHORTNESS OF BREATH: 0
PHOTOPHOBIA: 1
EYE REDNESS: 1
EYE ITCHING: 0
EYE DISCHARGE: 1

## 2023-04-03 ASSESSMENT — PAIN DESCRIPTION - LOCATION: LOCATION: EYE

## 2023-04-03 ASSESSMENT — PAIN DESCRIPTION - DESCRIPTORS: DESCRIPTORS: ACHING

## 2023-04-03 ASSESSMENT — PAIN DESCRIPTION - PAIN TYPE: TYPE: ACUTE PAIN

## 2023-04-03 ASSESSMENT — PAIN DESCRIPTION - ORIENTATION: ORIENTATION: RIGHT

## 2023-04-03 ASSESSMENT — PAIN SCALES - GENERAL: PAINLEVEL_OUTOF10: 5

## 2023-04-03 ASSESSMENT — PAIN - FUNCTIONAL ASSESSMENT: PAIN_FUNCTIONAL_ASSESSMENT: 0-10

## 2023-04-03 NOTE — ED NOTES
Injection site clean, dry and intact. VS WNL. Skin is warm and dry. Respirations are easy & non-labored. Reviewed discharge instructions and voiced understanding.       Bryce Calvo RN  04/03/23 6002

## 2023-04-03 NOTE — ED PROVIDER NOTES
[facility]  Urgent Care Encounter      CHIEF COMPLAINT       Chief Complaint   Patient presents with    Foreign Body in Eye     Right eye while hanging vacuum to wall-possible metal occurred at [phone] 4/2/23       Nurses Notes reviewed and I agree except as noted in the HPI. HISTORY OF PRESENT ILLNESS   Raisa Otoole is a 58 y.o. male who presents for evaluation of possible foreign body in the right eye. Onset of symptoms yesterday while grinding metal.  Patient complains of continuous right eye pain. Associated watery drainage. No photophobia. Patient flushed his eye out yesterday with some relief. He states that symptoms did worsen this morning. REVIEW OF SYSTEMS     Review of Systems   Constitutional:  Negative for chills, diaphoresis, fatigue and fever. HENT:  Negative for trouble swallowing. Eyes:  Positive for photophobia, pain, discharge and redness. Negative for itching and visual disturbance. Respiratory:  Negative for shortness of breath. Cardiovascular:  Negative for chest pain. Gastrointestinal:  Negative for nausea. Musculoskeletal:  Negative for neck pain and neck stiffness. Skin:  Negative for rash. Neurological:  Negative for dizziness, light-headedness and headaches. Hematological:  Negative for adenopathy. PAST MEDICAL HISTORY         Diagnosis Date    Asthma     COPD (chronic obstructive pulmonary disease) ([facility] [address]        SURGICAL HISTORY     Patient  has a past surgical history that includes Appendectomy and hernia repair.     CURRENT MEDICATIONS       Discharge Medication List as of 4/3/2023  9:30 AM        CONTINUE these medications which have NOT CHANGED    Details   zinc 50 MG CAPS Take by mouthHistorical Med      ipratropium-albuterol (DUONEB) 0.5-2.5 (3) MG/3ML SOLN nebulizer solution Inhale 3 mLs into the lungs every 4 hours as needed for Shortness of Breath, Disp-360 mL, R-3Normal      ascorbic acid (VITAMIN C) 500 MG tablet Take 1

## 2023-08-25 ENCOUNTER — HOSPITAL ENCOUNTER (OUTPATIENT)
Age: 63
Discharge: HOME | End: 2023-08-25
Payer: COMMERCIAL

## 2023-08-25 LAB
FOLATE SERPL-MCNC: 9 NG/ML (ref 4.8–24.2)
VIT B12 SERPL-MCNC: 255 PG/ML (ref 211–911)

## 2023-08-25 PROCEDURE — 82607 VITAMIN B-12: CPT

## 2023-08-25 PROCEDURE — 36415 COLL VENOUS BLD VENIPUNCTURE: CPT

## 2023-08-25 PROCEDURE — 82746 ASSAY OF FOLIC ACID SERUM: CPT

## 2023-12-09 ENCOUNTER — HOSPITAL ENCOUNTER (EMERGENCY)
Age: 63
Discharge: HOME | End: 2023-12-09
Payer: COMMERCIAL

## 2023-12-09 ENCOUNTER — APPOINTMENT (OUTPATIENT)
Dept: GENERAL RADIOLOGY | Age: 63
End: 2023-12-09
Payer: COMMERCIAL

## 2023-12-09 VITALS
OXYGEN SATURATION: 95 % | BODY MASS INDEX: 30.78 KG/M2 | HEART RATE: 67 BPM | SYSTOLIC BLOOD PRESSURE: 137 MMHG | TEMPERATURE: 97.3 F | WEIGHT: 215 LBS | HEIGHT: 70 IN | RESPIRATION RATE: 20 BRPM | DIASTOLIC BLOOD PRESSURE: 77 MMHG

## 2023-12-09 DIAGNOSIS — U07.1 COVID-19: Primary | ICD-10-CM

## 2023-12-09 DIAGNOSIS — J44.1 COPD EXACERBATION (HCC): ICD-10-CM

## 2023-12-09 LAB
EKG ATRIAL RATE: 61 BPM
EKG ATRIAL RATE: 62 BPM
EKG P AXIS: 47 DEGREES
EKG P AXIS: 55 DEGREES
EKG P-R INTERVAL: 154 MS
EKG P-R INTERVAL: 162 MS
EKG Q-T INTERVAL: 454 MS
EKG Q-T INTERVAL: 456 MS
EKG QRS DURATION: 170 MS
EKG QRS DURATION: 170 MS
EKG QTC CALCULATION (BAZETT): 459 MS
EKG QTC CALCULATION (BAZETT): 460 MS
EKG R AXIS: -83 DEGREES
EKG R AXIS: -84 DEGREES
EKG T AXIS: 34 DEGREES
EKG T AXIS: 51 DEGREES
EKG VENTRICULAR RATE: 61 BPM
EKG VENTRICULAR RATE: 62 BPM
FLUAV RNA RESP QL NAA+PROBE: NOT DETECTED
FLUBV RNA RESP QL NAA+PROBE: NOT DETECTED
SARS-COV-2 RNA RESP QL NAA+PROBE: DETECTED

## 2023-12-09 PROCEDURE — 94640 AIRWAY INHALATION TREATMENT: CPT

## 2023-12-09 PROCEDURE — 93005 ELECTROCARDIOGRAM TRACING: CPT | Performed by: FAMILY MEDICINE

## 2023-12-09 PROCEDURE — 87636 SARSCOV2 & INF A&B AMP PRB: CPT

## 2023-12-09 PROCEDURE — 99213 OFFICE O/P EST LOW 20 MIN: CPT | Performed by: NURSE PRACTITIONER

## 2023-12-09 PROCEDURE — 99284 EMERGENCY DEPT VISIT MOD MDM: CPT

## 2023-12-09 PROCEDURE — 71046 X-RAY EXAM CHEST 2 VIEWS: CPT

## 2023-12-09 PROCEDURE — 6370000000 HC RX 637 (ALT 250 FOR IP): Performed by: NURSE PRACTITIONER

## 2023-12-09 PROCEDURE — 93010 ELECTROCARDIOGRAM REPORT: CPT | Performed by: INTERNAL MEDICINE

## 2023-12-09 PROCEDURE — 93005 ELECTROCARDIOGRAM TRACING: CPT | Performed by: NURSE PRACTITIONER

## 2023-12-09 PROCEDURE — 99215 OFFICE O/P EST HI 40 MIN: CPT

## 2023-12-09 RX ORDER — PREDNISONE 10 MG/1
TABLET ORAL
Qty: 20 TABLET | Refills: 0 | Status: SHIPPED | OUTPATIENT
Start: 2023-12-09 | End: 2023-12-19

## 2023-12-09 RX ORDER — BENZONATATE 100 MG/1
100 CAPSULE ORAL 3 TIMES DAILY PRN
Qty: 30 CAPSULE | Refills: 0 | Status: SHIPPED | OUTPATIENT
Start: 2023-12-09 | End: 2023-12-16

## 2023-12-09 RX ORDER — BENZONATATE 100 MG/1
100 CAPSULE ORAL ONCE
Status: COMPLETED | OUTPATIENT
Start: 2023-12-09 | End: 2023-12-09

## 2023-12-09 RX ORDER — AZITHROMYCIN 250 MG/1
TABLET, FILM COATED ORAL
Qty: 1 PACKET | Refills: 0 | Status: SHIPPED | OUTPATIENT
Start: 2023-12-09 | End: 2023-12-13

## 2023-12-09 RX ORDER — IPRATROPIUM BROMIDE AND ALBUTEROL SULFATE 2.5; .5 MG/3ML; MG/3ML
1 SOLUTION RESPIRATORY (INHALATION) ONCE
Status: COMPLETED | OUTPATIENT
Start: 2023-12-09 | End: 2023-12-09

## 2023-12-09 RX ADMIN — BENZONATATE 100 MG: 100 CAPSULE ORAL at 09:20

## 2023-12-09 RX ADMIN — IPRATROPIUM BROMIDE AND ALBUTEROL SULFATE 1 DOSE: .5; 3 SOLUTION RESPIRATORY (INHALATION) at 09:38

## 2023-12-09 ASSESSMENT — PAIN - FUNCTIONAL ASSESSMENT: PAIN_FUNCTIONAL_ASSESSMENT: NONE - DENIES PAIN

## 2023-12-09 ASSESSMENT — ENCOUNTER SYMPTOMS
COUGH: 1
SHORTNESS OF BREATH: 1
VOMITING: 0
TROUBLE SWALLOWING: 0
SORE THROAT: 0
RHINORRHEA: 0
EYE DISCHARGE: 0
SINUS PRESSURE: 1
SINUS PAIN: 1
DIARRHEA: 0
NAUSEA: 0
EYE REDNESS: 0

## 2023-12-09 NOTE — ED NOTES
Pt to ED from [facility]. Pt states that he has had a stuffy nose and a cough. States he was sent here because his EKG looked \"funny. \" Pt states he just wanted some medicine.       Bonnie Cancino RN  12/09/23 0627

## 2023-12-09 NOTE — ED PROVIDER NOTES
[facility] Emergency Department    CHIEF COMPLAINT       Chief Complaint   Patient presents with    Cough    Shortness of Breath    Chest Congestion       Nurses Notes reviewed and I agree except as noted in the HPI. HISTORY OF PRESENT ILLNESS   Kacy Sage is a 58 y.o. male with PMH of COPD who presents to the ED for evaluation of cough, congestion, and abnormal EKG. The patient was seen today at urgent care for cough, congestion, and sinusitis. While at urgent care, they did an EKG which was abnormal so they sent him to the ED. The patient denies chest pain and diaphoresis however and is upset that the urgent care sent him here as he just wanted medication and does not have chest pain. He states that his cough, congestion, sneezing began Wednesday and then on Thursday the congestion moved more into his chest and he had an increase of coughing. He states he was short of breath and used DuoNeb every 4 hours and his last utilization of DuoNeb was at 4 AM today. He states he used a DuoNeb as he took his pulse ox and it was at 88 so he used DuoNeb and oxygen and raised it to 94%. He denies fever, chest pain nausea/vomiting/diarrhea. His wife also states she has the same symptoms and was seen at urgent care and was diagnosed with a viral URI. HPI was provided by the patient. PAST MEDICAL HISTORY     Past Medical History:   Diagnosis Date    Asthma     COPD (chronic obstructive pulmonary disease) ([facility])        SURGICALHISTORY      has a past surgical history that includes Appendectomy and hernia repair.     CURRENT MEDICATIONS       Discharge Medication List as of 12/9/2023 10:01 AM        CONTINUE these medications which have NOT CHANGED    Details   zinc 50 MG CAPS Take by mouthHistorical Med      ipratropium-albuterol (DUONEB) 0.5-2.5 (3) MG/3ML SOLN nebulizer solution Inhale 3 mLs into the lungs every 4 hours as needed for Shortness of Breath, Disp-360 mL, R-3Normal      ascorbic acid

## 2023-12-09 NOTE — ED PROVIDER NOTES
[facility]  Urgent Care Encounter      CHIEF COMPLAINT       Chief Complaint   Patient presents with    Cough    Shortness of Breath    Chest Congestion       Nurses Notes reviewed and I agree except as noted in the HPI. HISTORY OF PRESENT ILLNESS   Spencer Mondragon is a 58 y.o. male who presents with a 3-day history of increased cough, congestion, sinus pressure, and a 1 day history of shortness of breath. Patient states that he became increasingly shortness of breath last night. His pulse ox was running 88% at home. Patient is denying fever. Appetite is normal.  Patient does have a history of mild COPD due to years of smoking. Patient is denying chest pain at today's visit. REVIEW OF SYSTEMS     Review of Systems   Constitutional:  Negative for chills, diaphoresis, fatigue and fever. HENT:  Positive for congestion, sinus pressure and sinus pain. Negative for ear pain, rhinorrhea, sore throat and trouble swallowing. Eyes:  Negative for discharge and redness. Respiratory:  Positive for cough and shortness of breath. Cardiovascular:  Negative for chest pain. Gastrointestinal:  Negative for diarrhea, nausea and vomiting. Genitourinary:  Negative for decreased urine volume. Musculoskeletal:  Negative for neck pain and neck stiffness. Skin:  Negative for rash. Neurological:  Negative for headaches. Hematological:  Negative for adenopathy. Psychiatric/Behavioral:  Negative for sleep disturbance. PAST MEDICAL HISTORY         Diagnosis Date    Asthma     COPD (chronic obstructive pulmonary disease) ([address])        SURGICAL HISTORY     Patient  has a past surgical history that includes Appendectomy and hernia repair.     CURRENT MEDICATIONS       Current Discharge Medication List        CONTINUE these medications which have NOT CHANGED    Details   zinc 50 MG CAPS Take by mouth      ipratropium-albuterol (DUONEB) 0.5-2.5 (3) MG/3ML SOLN nebulizer solution Inhale

## 2023-12-09 NOTE — ED NOTES
Pt complains that he has had cough and sinus congestion since Tuesday.  States it began to move down into his chest. Then on last night he woke up out of his sleep short of breath and checked his oxygen and it was 88%     Lise Watson RN  12/09/23 7811

## 2024-01-07 ENCOUNTER — HOSPITAL ENCOUNTER (EMERGENCY)
Age: 64
Discharge: HOME | End: 2024-01-07
Payer: COMMERCIAL

## 2024-01-07 ENCOUNTER — APPOINTMENT (OUTPATIENT)
Dept: GENERAL RADIOLOGY | Age: 64
End: 2024-01-07
Payer: COMMERCIAL

## 2024-01-07 ENCOUNTER — APPOINTMENT (OUTPATIENT)
Dept: CT IMAGING | Age: 64
End: 2024-01-07
Payer: COMMERCIAL

## 2024-01-07 VITALS
BODY MASS INDEX: 30.78 KG/M2 | DIASTOLIC BLOOD PRESSURE: 76 MMHG | HEIGHT: 70 IN | TEMPERATURE: 97.5 F | OXYGEN SATURATION: 98 % | SYSTOLIC BLOOD PRESSURE: 148 MMHG | HEART RATE: 72 BPM | WEIGHT: 215 LBS | RESPIRATION RATE: 18 BRPM

## 2024-01-07 DIAGNOSIS — J44.1 COPD EXACERBATION (HCC): Primary | ICD-10-CM

## 2024-01-07 DIAGNOSIS — J18.9 PNEUMONIA DUE TO INFECTIOUS ORGANISM, UNSPECIFIED LATERALITY, UNSPECIFIED PART OF LUNG: ICD-10-CM

## 2024-01-07 LAB
ANION GAP SERPL CALC-SCNC: 10 MEQ/L (ref 8–16)
BASOPHILS ABSOLUTE: 0 THOU/MM3 (ref 0–0.1)
BASOPHILS NFR BLD AUTO: 0.5 %
BUN SERPL-MCNC: 16 MG/DL (ref 7–22)
CALCIUM SERPL-MCNC: 9.5 MG/DL (ref 8.5–10.5)
CHLORIDE SERPL-SCNC: 99 MEQ/L (ref 98–111)
CO2 SERPL-SCNC: 29 MEQ/L (ref 23–33)
CREAT SERPL-MCNC: 1.2 MG/DL (ref 0.4–1.2)
DEPRECATED RDW RBC AUTO: 53.1 FL (ref 35–45)
EKG ATRIAL RATE: 77 BPM
EKG P AXIS: 76 DEGREES
EKG P-R INTERVAL: 158 MS
EKG Q-T INTERVAL: 416 MS
EKG QRS DURATION: 154 MS
EKG QTC CALCULATION (BAZETT): 470 MS
EKG R AXIS: -83 DEGREES
EKG T AXIS: 61 DEGREES
EKG VENTRICULAR RATE: 77 BPM
EOSINOPHIL NFR BLD AUTO: 1.9 %
EOSINOPHILS ABSOLUTE: 0.1 THOU/MM3 (ref 0–0.4)
ERYTHROCYTE [DISTWIDTH] IN BLOOD BY AUTOMATED COUNT: 14 % (ref 11.5–14.5)
GFR SERPL CREATININE-BSD FRML MDRD: > 60 ML/MIN/1.73M2
GLUCOSE SERPL-MCNC: 146 MG/DL (ref 70–108)
HCT VFR BLD AUTO: 46.9 % (ref 42–52)
HGB BLD-MCNC: 15.7 GM/DL (ref 14–18)
IMM GRANULOCYTES # BLD AUTO: 0.04 THOU/MM3 (ref 0–0.07)
IMM GRANULOCYTES NFR BLD AUTO: 0.7 %
LYMPHOCYTES ABSOLUTE: 1 THOU/MM3 (ref 1–4.8)
LYMPHOCYTES NFR BLD AUTO: 17.6 %
MCH RBC QN AUTO: 34.5 PG (ref 26–33)
MCHC RBC AUTO-ENTMCNC: 33.5 GM/DL (ref 32.2–35.5)
MCV RBC AUTO: 103.1 FL (ref 80–94)
MONOCYTES ABSOLUTE: 0.6 THOU/MM3 (ref 0.4–1.3)
MONOCYTES NFR BLD AUTO: 9.8 %
NEUTROPHILS NFR BLD AUTO: 69.5 %
NRBC BLD AUTO-RTO: 0 /100 WBC
NT-PROBNP SERPL IA-MCNC: 993.2 PG/ML (ref 0–124)
OSMOLALITY SERPL CALC.SUM OF ELEC: 279.5 MOSMOL/KG (ref 275–300)
PLATELET # BLD AUTO: 210 THOU/MM3 (ref 130–400)
PMV BLD AUTO: 8.8 FL (ref 9.4–12.4)
POTASSIUM SERPL-SCNC: 4.6 MEQ/L (ref 3.5–5.2)
PROCALCITONIN SERPL IA-MCNC: 0.12 NG/ML (ref 0.01–0.09)
RBC # BLD AUTO: 4.55 MILL/MM3 (ref 4.7–6.1)
SEGMENTED NEUTROPHILS ABSOLUTE COUNT: 4 THOU/MM3 (ref 1.8–7.7)
SODIUM SERPL-SCNC: 138 MEQ/L (ref 135–145)
TROPONIN, HIGH SENSITIVITY: 25 NG/L (ref 0–12)
TROPONIN, HIGH SENSITIVITY: 28 NG/L (ref 0–12)
WBC # BLD AUTO: 5.8 THOU/MM3 (ref 4.8–10.8)

## 2024-01-07 PROCEDURE — 6360000004 HC RX CONTRAST MEDICATION: Performed by: PHYSICIAN ASSISTANT

## 2024-01-07 PROCEDURE — 71046 X-RAY EXAM CHEST 2 VIEWS: CPT

## 2024-01-07 PROCEDURE — 71275 CT ANGIOGRAPHY CHEST: CPT

## 2024-01-07 PROCEDURE — 94640 AIRWAY INHALATION TREATMENT: CPT

## 2024-01-07 PROCEDURE — 94761 N-INVAS EAR/PLS OXIMETRY MLT: CPT

## 2024-01-07 PROCEDURE — 6370000000 HC RX 637 (ALT 250 FOR IP): Performed by: PHYSICIAN ASSISTANT

## 2024-01-07 PROCEDURE — 84145 PROCALCITONIN (PCT): CPT

## 2024-01-07 PROCEDURE — 80048 BASIC METABOLIC PNL TOTAL CA: CPT

## 2024-01-07 PROCEDURE — 93010 ELECTROCARDIOGRAM REPORT: CPT | Performed by: INTERNAL MEDICINE

## 2024-01-07 PROCEDURE — 36415 COLL VENOUS BLD VENIPUNCTURE: CPT

## 2024-01-07 PROCEDURE — 96374 THER/PROPH/DIAG INJ IV PUSH: CPT

## 2024-01-07 PROCEDURE — 83880 ASSAY OF NATRIURETIC PEPTIDE: CPT

## 2024-01-07 PROCEDURE — 6360000002 HC RX W HCPCS: Performed by: PHYSICIAN ASSISTANT

## 2024-01-07 PROCEDURE — 99285 EMERGENCY DEPT VISIT HI MDM: CPT

## 2024-01-07 PROCEDURE — 93005 ELECTROCARDIOGRAM TRACING: CPT | Performed by: PHYSICIAN ASSISTANT

## 2024-01-07 PROCEDURE — 84484 ASSAY OF TROPONIN QUANT: CPT

## 2024-01-07 PROCEDURE — 85025 COMPLETE CBC W/AUTO DIFF WBC: CPT

## 2024-01-07 RX ORDER — IPRATROPIUM BROMIDE AND ALBUTEROL SULFATE 2.5; .5 MG/3ML; MG/3ML
1 SOLUTION RESPIRATORY (INHALATION)
Status: DISCONTINUED | OUTPATIENT
Start: 2024-01-07 | End: 2024-01-07

## 2024-01-07 RX ORDER — LEVOFLOXACIN 500 MG/1
500 TABLET, FILM COATED ORAL DAILY
Qty: 10 TABLET | Refills: 0 | Status: SHIPPED | OUTPATIENT
Start: 2024-01-07 | End: 2024-01-17

## 2024-01-07 RX ORDER — IPRATROPIUM BROMIDE AND ALBUTEROL SULFATE 2.5; .5 MG/3ML; MG/3ML
1 SOLUTION RESPIRATORY (INHALATION) ONCE
Status: COMPLETED | OUTPATIENT
Start: 2024-01-07 | End: 2024-01-07

## 2024-01-07 RX ORDER — IPRATROPIUM BROMIDE AND ALBUTEROL SULFATE 2.5; .5 MG/3ML; MG/3ML
1 SOLUTION RESPIRATORY (INHALATION) EVERY 4 HOURS
Qty: 360 ML | Refills: 0 | Status: SHIPPED | OUTPATIENT
Start: 2024-01-07

## 2024-01-07 RX ORDER — PREDNISONE 20 MG/1
TABLET ORAL
Qty: 15 TABLET | Refills: 0 | Status: SHIPPED | OUTPATIENT
Start: 2024-01-07

## 2024-01-07 RX ADMIN — IPRATROPIUM BROMIDE AND ALBUTEROL SULFATE 1 DOSE: .5; 3 SOLUTION RESPIRATORY (INHALATION) at 08:36

## 2024-01-07 RX ADMIN — METHYLPREDNISOLONE SODIUM SUCCINATE 125 MG: 125 INJECTION, POWDER, FOR SOLUTION INTRAMUSCULAR; INTRAVENOUS at 08:36

## 2024-01-07 RX ADMIN — IOPAMIDOL 80 ML: 755 INJECTION, SOLUTION INTRAVENOUS at 10:02

## 2024-01-07 ASSESSMENT — PAIN - FUNCTIONAL ASSESSMENT
PAIN_FUNCTIONAL_ASSESSMENT: NONE - DENIES PAIN
PAIN_FUNCTIONAL_ASSESSMENT: NONE - DENIES PAIN

## 2024-01-07 NOTE — ED NOTES
Pt presents to the ED with complaints of SOB. Pt states he has been doing albuterol tx every 4 hours at home. Pt states he had a cough that was productive,  but it is no longer productive. Pt pulse ox on RA was 91% on RA. Pt respirations are even and unlabored.

## 2024-01-07 NOTE — ED PROVIDER NOTES
Pulse: 75 69 70 72   Resp: 20 18 22 18   Temp: 97.5 °F (36.4 °C)      TempSrc: Oral      SpO2: 91% 90% 97% 98%   Weight: 97.5 kg (215 lb)      Height: 1.778 m (5' 10\")          The patient was seen and examined. Appropriate diagnostic testing was performed and results reviewed with the patient.  Patient was resting comfortably in a chair after receiving DuoNeb as well as Solu-Medrol.  He was not requiring any oxygen.  He does have oxygen at home he uses as needed.  I did offer admission versus going home.  He does have aerosols at home as well as oxygen as well as steroids.  Will treat the pneumonia and treat aggressively his COPD exacerbation with steroids and DuoNeb.  The patient will be followed up as below.  Case was staffed with Dr. Oneill        The results of pertinent diagnostic studies and exam findings were discussed.     ED Medications administered this visit:  (None if blank)  Medications   methylPREDNISolone sodium (PF) (SOLU-MEDROL PF) injection 125 mg (125 mg IntraVENous Given 1/7/24 0836)   ipratropium 0.5 mg-albuterol 2.5 mg (DUONEB) nebulizer solution 1 Dose (1 Dose Inhalation Given 1/7/24 0836)   iopamidol (ISOVUE-370) 76 % injection 80 mL (80 mLs IntraVENous Given 1/7/24 1002)         PROCEDURES: (None if blank)      CRITICAL CARE: (None if blank)      DISCHARGE PRESCRIPTIONS: (None if blank)  Discharge Medication List as of 1/7/2024 11:32 AM        START taking these medications    Details   levoFLOXacin (LEVAQUIN) 500 MG tablet Take 1 tablet by mouth daily for 10 days, Disp-10 tablet, R-0Normal      !! ipratropium 0.5 mg-albuterol 2.5 mg (DUONEB) 0.5-2.5 (3) MG/3ML SOLN nebulizer solution Inhale 3 mLs into the lungs every 4 hours, Disp-360 mL, R-0Normal      predniSONE (DELTASONE) 20 MG tablet Take 3 tablets by mouth once daily for 5 days, Disp-15 tablet, R-0Normal       !! - Potential duplicate medications found. Please discuss with provider.          FINAL IMPRESSION      1. COPD

## 2024-03-02 ENCOUNTER — HOSPITAL ENCOUNTER (OUTPATIENT)
Age: 64
Discharge: HOME | End: 2024-03-02
Payer: COMMERCIAL

## 2024-03-02 LAB
ALBUMIN SERPL BCG-MCNC: 4.2 G/DL (ref 3.5–5.1)
ALP SERPL-CCNC: 72 U/L (ref 38–126)
ALT SERPL W/O P-5'-P-CCNC: 23 U/L (ref 11–66)
ANION GAP SERPL CALC-SCNC: 11 MEQ/L (ref 8–16)
AST SERPL-CCNC: 19 U/L (ref 5–40)
BASOPHILS ABSOLUTE: 0 THOU/MM3 (ref 0–0.1)
BASOPHILS NFR BLD AUTO: 0.5 %
BILIRUB CONJ SERPL-MCNC: < 0.2 MG/DL (ref 0–0.3)
BILIRUB SERPL-MCNC: 0.5 MG/DL (ref 0.3–1.2)
BUN SERPL-MCNC: 17 MG/DL (ref 7–22)
CALCIUM SERPL-MCNC: 9 MG/DL (ref 8.5–10.5)
CHLORIDE SERPL-SCNC: 97 MEQ/L (ref 98–111)
CHOLEST SERPL-MCNC: 169 MG/DL (ref 100–199)
CO2 SERPL-SCNC: 28 MEQ/L (ref 23–33)
CREAT SERPL-MCNC: 1.1 MG/DL (ref 0.4–1.2)
CREAT UR-MCNC: 117.2 MG/DL
DEPRECATED RDW RBC AUTO: 55.8 FL (ref 35–45)
EOSINOPHIL NFR BLD AUTO: 2.1 %
EOSINOPHILS ABSOLUTE: 0.1 THOU/MM3 (ref 0–0.4)
ERYTHROCYTE [DISTWIDTH] IN BLOOD BY AUTOMATED COUNT: 14.5 % (ref 11.5–14.5)
GFR SERPL CREATININE-BSD FRML MDRD: > 60 ML/MIN/1.73M2
GLUCOSE FASTING: 132 MG/DL (ref 70–108)
HCT VFR BLD AUTO: 49 % (ref 42–52)
HDLC SERPL-MCNC: 45 MG/DL
HGB BLD-MCNC: 16.4 GM/DL (ref 14–18)
IMM GRANULOCYTES # BLD AUTO: 0.03 THOU/MM3 (ref 0–0.07)
IMM GRANULOCYTES NFR BLD AUTO: 0.5 %
LDLC SERPL CALC-MCNC: 103 MG/DL
LYMPHOCYTES ABSOLUTE: 1.3 THOU/MM3 (ref 1–4.8)
LYMPHOCYTES NFR BLD AUTO: 21.2 %
MCH RBC QN AUTO: 35.1 PG (ref 26–33)
MCHC RBC AUTO-ENTMCNC: 33.5 GM/DL (ref 32.2–35.5)
MCV RBC AUTO: 104.9 FL (ref 80–94)
MICROALBUMIN UR-MCNC: 3.56 MG/DL
MICROALBUMIN/CREAT RATIO PNL UR: 30 MG/G (ref 0–30)
MONOCYTES ABSOLUTE: 0.5 THOU/MM3 (ref 0.4–1.3)
MONOCYTES NFR BLD AUTO: 8.3 %
NEUTROPHILS NFR BLD AUTO: 67.4 %
NRBC BLD AUTO-RTO: 0 /100 WBC
PLATELET # BLD AUTO: 222 THOU/MM3 (ref 130–400)
PMV BLD AUTO: 9 FL (ref 9.4–12.4)
POTASSIUM SERPL-SCNC: 4.4 MEQ/L (ref 3.5–5.2)
PROT SERPL-MCNC: 7.1 G/DL (ref 6.1–8)
PSA SERPL-MCNC: 0.21 NG/ML (ref 0–1)
RBC # BLD AUTO: 4.67 MILL/MM3 (ref 4.7–6.1)
SEGMENTED NEUTROPHILS ABSOLUTE COUNT: 4.1 THOU/MM3 (ref 1.8–7.7)
SODIUM SERPL-SCNC: 136 MEQ/L (ref 135–145)
TRIGL SERPL-MCNC: 104 MG/DL (ref 0–199)
WBC # BLD AUTO: 6.1 THOU/MM3 (ref 4.8–10.8)

## 2024-03-02 PROCEDURE — 36415 COLL VENOUS BLD VENIPUNCTURE: CPT

## 2024-03-02 PROCEDURE — 80076 HEPATIC FUNCTION PANEL: CPT

## 2024-03-02 PROCEDURE — 80048 BASIC METABOLIC PNL TOTAL CA: CPT

## 2024-03-02 PROCEDURE — 82043 UR ALBUMIN QUANTITATIVE: CPT

## 2024-03-02 PROCEDURE — 85025 COMPLETE CBC W/AUTO DIFF WBC: CPT

## 2024-03-02 PROCEDURE — G0103 PSA SCREENING: HCPCS

## 2024-03-02 PROCEDURE — 80061 LIPID PANEL: CPT

## 2024-09-04 ENCOUNTER — HOSPITAL ENCOUNTER (OUTPATIENT)
Age: 64
Discharge: HOME | End: 2024-09-04
Payer: COMMERCIAL

## 2024-09-04 LAB
ALBUMIN SERPL BCG-MCNC: 4.1 G/DL (ref 3.5–5.1)
ALP SERPL-CCNC: 72 U/L (ref 38–126)
ALT SERPL W/O P-5'-P-CCNC: 15 U/L (ref 11–66)
ANION GAP SERPL CALC-SCNC: 11 MEQ/L (ref 8–16)
AST SERPL-CCNC: 15 U/L (ref 5–40)
BILIRUB CONJ SERPL-MCNC: < 0.1 MG/DL (ref 0.1–13.8)
BILIRUB SERPL-MCNC: 0.4 MG/DL (ref 0.3–1.2)
BUN SERPL-MCNC: 14 MG/DL (ref 7–22)
CALCIUM SERPL-MCNC: 9 MG/DL (ref 8.5–10.5)
CHLORIDE SERPL-SCNC: 97 MEQ/L (ref 98–111)
CHOLEST SERPL-MCNC: 162 MG/DL (ref 100–199)
CO2 SERPL-SCNC: 30 MEQ/L (ref 23–33)
CREAT SERPL-MCNC: 1.2 MG/DL (ref 0.4–1.2)
DEPRECATED MEAN GLUCOSE BLD GHB EST-ACNC: 120 MG/DL (ref 70–126)
FOLATE SERPL-MCNC: 13.5 NG/ML (ref 4.8–24.2)
GFR SERPL CREATININE-BSD FRML MDRD: 67 ML/MIN/1.73M2
GLUCOSE FASTING: 120 MG/DL (ref 70–108)
HBA1C MFR BLD HPLC: 6 % (ref 4.4–6.4)
HDLC SERPL-MCNC: 47 MG/DL
LDLC SERPL CALC-MCNC: 94 MG/DL
POTASSIUM SERPL-SCNC: 4.7 MEQ/L (ref 3.5–5.2)
PROT SERPL-MCNC: 7 G/DL (ref 6.1–8)
SODIUM SERPL-SCNC: 138 MEQ/L (ref 135–145)
TRIGL SERPL-MCNC: 104 MG/DL (ref 0–199)
VIT B12 SERPL-MCNC: 623 PG/ML (ref 211–911)

## 2024-09-04 PROCEDURE — 36415 COLL VENOUS BLD VENIPUNCTURE: CPT

## 2024-09-04 PROCEDURE — 80061 LIPID PANEL: CPT

## 2024-09-04 PROCEDURE — 80048 BASIC METABOLIC PNL TOTAL CA: CPT

## 2024-09-04 PROCEDURE — 83036 HEMOGLOBIN GLYCOSYLATED A1C: CPT

## 2024-09-04 PROCEDURE — 82746 ASSAY OF FOLIC ACID SERUM: CPT

## 2024-09-04 PROCEDURE — 82607 VITAMIN B-12: CPT

## 2024-09-04 PROCEDURE — 80076 HEPATIC FUNCTION PANEL: CPT

## 2024-11-15 ENCOUNTER — HOSPITAL ENCOUNTER (EMERGENCY)
Age: 64
Discharge: HOME | End: 2024-11-15
Payer: COMMERCIAL

## 2024-11-15 VITALS
RESPIRATION RATE: 20 BRPM | SYSTOLIC BLOOD PRESSURE: 138 MMHG | TEMPERATURE: 97.1 F | HEART RATE: 67 BPM | DIASTOLIC BLOOD PRESSURE: 70 MMHG | OXYGEN SATURATION: 96 %

## 2024-11-15 DIAGNOSIS — J20.9 ACUTE BRONCHITIS, UNSPECIFIED ORGANISM: Primary | ICD-10-CM

## 2024-11-15 PROCEDURE — 99213 OFFICE O/P EST LOW 20 MIN: CPT

## 2024-11-15 RX ORDER — METAXALONE 800 MG/1
TABLET ORAL
COMMUNITY
Start: 2024-09-11

## 2024-11-15 RX ORDER — TRAMADOL HYDROCHLORIDE 50 MG/1
TABLET ORAL
COMMUNITY
Start: 2024-09-11

## 2024-11-15 RX ORDER — TAMSULOSIN HYDROCHLORIDE 0.4 MG/1
CAPSULE ORAL
COMMUNITY
Start: 2024-09-11

## 2024-11-15 RX ORDER — AZITHROMYCIN 250 MG/1
TABLET, FILM COATED ORAL
Qty: 1 PACKET | Refills: 0 | Status: SHIPPED | OUTPATIENT
Start: 2024-11-15 | End: 2024-11-19

## 2024-11-15 RX ORDER — PREDNISONE 20 MG/1
20 TABLET ORAL 2 TIMES DAILY
Qty: 10 TABLET | Refills: 0 | Status: SHIPPED | OUTPATIENT
Start: 2024-11-15 | End: 2024-11-20

## 2024-11-15 ASSESSMENT — ENCOUNTER SYMPTOMS
SINUS PRESSURE: 1
SORE THROAT: 1
COUGH: 1

## 2024-11-15 NOTE — ED PROVIDER NOTES
[facility] URGENT CARE  Urgent Care Encounter       CHIEF COMPLAINT       Chief Complaint   Patient presents with    Cough    Sinusitis       Nurses Notes reviewed and I agree except as noted in the HPI.  HISTORY OF PRESENT ILLNESS   Jose M Haro is a 64 y.o. male who presents with complaints of cough, sinus pressure, sore throat, and congestion.  Patient reports that symptom started yesterday at 11:00.  Patient reports he has COPD and normally gets sick pretty quickly.  Patient reports that there is illness going around to his work currently.    The history is provided by the patient.       REVIEW OF SYSTEMS     Review of Systems   Constitutional:  Negative for chills, fatigue and fever.   HENT:  Positive for congestion, sinus pressure and sore throat.    Respiratory:  Positive for cough.    Musculoskeletal:  Negative for myalgias.   Neurological:  Negative for headaches.   All other systems reviewed and are negative.      PAST MEDICAL HISTORY         Diagnosis Date    Asthma     COPD (chronic obstructive pulmonary disease) ([facility])        SURGICALHISTORY     Patient  has a past surgical history that includes Appendectomy and hernia repair.    CURRENT MEDICATIONS       Discharge Medication List as of 11/15/2024  8:21 AM        CONTINUE these medications which have NOT CHANGED    Details   metaxalone (SKELAXIN) 800 MG tablet TAKE 1 TABLET AS NEEDED MUSCLE PAIN ORALLY THREE TIMES A DAY 10 DAYSHistorical Med      tamsulosin (FLOMAX) 0.4 MG capsule TAKE 1 CAPSULE BY MOUTH EVERY DAY FOR 90 DAYSHistorical Med      traMADol (ULTRAM) 50 MG tablet 1 TABLET AS NEEDED ORALLY EVERY 8 HRS M15.0 G89.29Historical Med      !! ipratropium 0.5 mg-albuterol 2.5 mg (DUONEB) 0.5-2.5 (3) MG/3ML SOLN nebulizer solution Inhale 3 mLs into the lungs every 4 hours, Disp-360 mL, R-0Normal      zinc 50 MG CAPS Take by mouthHistorical Med      !! ipratropium-albuterol (DUONEB) 0.5-2.5 (3) MG/3ML SOLN nebulizer solution Inhale 3  97.1 °F (36.2 °C), Pulse: 67, Respirations: 20, SpO2: 96 %,Estimated body mass index is 30.85 kg/m² as calculated from the following:    Height as of 1/7/24: 1.778 m (5' 10\").    Weight as of 1/7/24: 97.5 kg (215 lb).,No LMP for male patient.    Physical Exam  Vitals and nursing note reviewed.   Constitutional:       Appearance: He is obese.   HENT:      Head: Normocephalic.      Right Ear: Tympanic membrane normal.      Left Ear: Tympanic membrane normal.      Nose: Congestion present.      Right Sinus: Maxillary sinus tenderness present. No frontal sinus tenderness.      Left Sinus: Maxillary sinus tenderness present. No frontal sinus tenderness.      Mouth/Throat:      Mouth: Mucous membranes are moist.      Pharynx: Oropharynx is clear. Posterior oropharyngeal erythema present.   Cardiovascular:      Rate and Rhythm: Normal rate and regular rhythm.      Heart sounds: Normal heart sounds.   Pulmonary:      Effort: Pulmonary effort is normal.      Breath sounds: Examination of the right-upper field reveals wheezing. Examination of the left-upper field reveals wheezing. Examination of the right-lower field reveals rhonchi. Examination of the left-lower field reveals rhonchi. Wheezing and rhonchi present.      Comments: Nonproductive cough  Lymphadenopathy:      Cervical: No cervical adenopathy.   Skin:     General: Skin is warm and dry.   Neurological:      Mental Status: He is alert.         DIAGNOSTIC RESULTS     Labs:No results found for this visit on 11/15/24.    IMAGING:    No orders to display         EKG:      URGENT CARE COURSE:     Vitals:    11/15/24 0813   BP: 138/70   Pulse: 67   Resp: 20   Temp: 97.1 °F (36.2 °C)   SpO2: 96%       Medications - No data to display         PROCEDURES:  None    FINAL IMPRESSION      1. Acute bronchitis, unspecified organism          DISPOSITION/ PLAN   Patient diagnosed with acute bronchitis.  Patient educated to take prednisone, and azithromycin.  Patient educated to use

## 2024-11-15 NOTE — DISCHARGE INSTRUCTIONS
Duonebs as needed  Prednisone as prescribed  Antibiotics as prescribed  Follow up with PCP as needed  ER If symptoms worsen